# Patient Record
Sex: MALE | Race: BLACK OR AFRICAN AMERICAN | NOT HISPANIC OR LATINO | URBAN - METROPOLITAN AREA
[De-identification: names, ages, dates, MRNs, and addresses within clinical notes are randomized per-mention and may not be internally consistent; named-entity substitution may affect disease eponyms.]

---

## 2019-05-23 ENCOUNTER — INPATIENT (INPATIENT)
Facility: HOSPITAL | Age: 59
LOS: 7 days | Discharge: HOME | End: 2019-05-31
Attending: INTERNAL MEDICINE | Admitting: INTERNAL MEDICINE
Payer: COMMERCIAL

## 2019-05-23 VITALS
OXYGEN SATURATION: 98 % | SYSTOLIC BLOOD PRESSURE: 153 MMHG | DIASTOLIC BLOOD PRESSURE: 87 MMHG | TEMPERATURE: 98 F | RESPIRATION RATE: 18 BRPM | HEART RATE: 84 BPM

## 2019-05-23 LAB
ALBUMIN SERPL ELPH-MCNC: 4 G/DL — SIGNIFICANT CHANGE UP (ref 3.3–5)
ALP SERPL-CCNC: 105 U/L — SIGNIFICANT CHANGE UP (ref 40–120)
ALT FLD-CCNC: 23 U/L — SIGNIFICANT CHANGE UP (ref 4–41)
ANION GAP SERPL CALC-SCNC: 13 MMO/L — SIGNIFICANT CHANGE UP (ref 7–14)
APTT BLD: 30.2 SEC — SIGNIFICANT CHANGE UP (ref 27.5–36.3)
AST SERPL-CCNC: 49 U/L — HIGH (ref 4–40)
BASOPHILS # BLD AUTO: 0.04 K/UL — SIGNIFICANT CHANGE UP (ref 0–0.2)
BASOPHILS NFR BLD AUTO: 0.7 % — SIGNIFICANT CHANGE UP (ref 0–2)
BILIRUB SERPL-MCNC: 0.4 MG/DL — SIGNIFICANT CHANGE UP (ref 0.2–1.2)
BUN SERPL-MCNC: 19 MG/DL — SIGNIFICANT CHANGE UP (ref 7–23)
CALCIUM SERPL-MCNC: 10.2 MG/DL — SIGNIFICANT CHANGE UP (ref 8.4–10.5)
CHLORIDE SERPL-SCNC: 98 MMOL/L — SIGNIFICANT CHANGE UP (ref 98–107)
CO2 SERPL-SCNC: 26 MMOL/L — SIGNIFICANT CHANGE UP (ref 22–31)
CREAT SERPL-MCNC: 1.13 MG/DL — SIGNIFICANT CHANGE UP (ref 0.5–1.3)
EOSINOPHIL # BLD AUTO: 0.22 K/UL — SIGNIFICANT CHANGE UP (ref 0–0.5)
EOSINOPHIL NFR BLD AUTO: 3.7 % — SIGNIFICANT CHANGE UP (ref 0–6)
GLUCOSE SERPL-MCNC: 127 MG/DL — HIGH (ref 70–99)
HCT VFR BLD CALC: 44.4 % — SIGNIFICANT CHANGE UP (ref 39–50)
HGB BLD-MCNC: 14.4 G/DL — SIGNIFICANT CHANGE UP (ref 13–17)
IMM GRANULOCYTES NFR BLD AUTO: 0.2 % — SIGNIFICANT CHANGE UP (ref 0–1.5)
INR BLD: 1.03 — SIGNIFICANT CHANGE UP (ref 0.88–1.17)
LYMPHOCYTES # BLD AUTO: 1.67 K/UL — SIGNIFICANT CHANGE UP (ref 1–3.3)
LYMPHOCYTES # BLD AUTO: 27.9 % — SIGNIFICANT CHANGE UP (ref 13–44)
MCHC RBC-ENTMCNC: 32 PG — SIGNIFICANT CHANGE UP (ref 27–34)
MCHC RBC-ENTMCNC: 32.4 % — SIGNIFICANT CHANGE UP (ref 32–36)
MCV RBC AUTO: 98.7 FL — SIGNIFICANT CHANGE UP (ref 80–100)
MONOCYTES # BLD AUTO: 0.74 K/UL — SIGNIFICANT CHANGE UP (ref 0–0.9)
MONOCYTES NFR BLD AUTO: 12.4 % — SIGNIFICANT CHANGE UP (ref 2–14)
NEUTROPHILS # BLD AUTO: 3.3 K/UL — SIGNIFICANT CHANGE UP (ref 1.8–7.4)
NEUTROPHILS NFR BLD AUTO: 55.1 % — SIGNIFICANT CHANGE UP (ref 43–77)
NRBC # FLD: 0 K/UL — SIGNIFICANT CHANGE UP (ref 0–0)
PLATELET # BLD AUTO: 229 K/UL — SIGNIFICANT CHANGE UP (ref 150–400)
PMV BLD: 9.7 FL — SIGNIFICANT CHANGE UP (ref 7–13)
POTASSIUM SERPL-MCNC: 4.5 MMOL/L — SIGNIFICANT CHANGE UP (ref 3.5–5.3)
POTASSIUM SERPL-SCNC: 4.5 MMOL/L — SIGNIFICANT CHANGE UP (ref 3.5–5.3)
PROT SERPL-MCNC: 8.4 G/DL — HIGH (ref 6–8.3)
PROTHROM AB SERPL-ACNC: 11.7 SEC — SIGNIFICANT CHANGE UP (ref 9.8–13.1)
RBC # BLD: 4.5 M/UL — SIGNIFICANT CHANGE UP (ref 4.2–5.8)
RBC # FLD: 12.2 % — SIGNIFICANT CHANGE UP (ref 10.3–14.5)
SODIUM SERPL-SCNC: 137 MMOL/L — SIGNIFICANT CHANGE UP (ref 135–145)
TROPONIN T, HIGH SENSITIVITY: 15 NG/L — SIGNIFICANT CHANGE UP (ref ?–14)
WBC # BLD: 5.98 K/UL — SIGNIFICANT CHANGE UP (ref 3.8–10.5)
WBC # FLD AUTO: 5.98 K/UL — SIGNIFICANT CHANGE UP (ref 3.8–10.5)

## 2019-05-23 PROCEDURE — 71045 X-RAY EXAM CHEST 1 VIEW: CPT | Mod: 26

## 2019-05-23 RX ORDER — ASPIRIN/CALCIUM CARB/MAGNESIUM 324 MG
324 TABLET ORAL ONCE
Refills: 0 | Status: COMPLETED | OUTPATIENT
Start: 2019-05-23 | End: 2019-05-23

## 2019-05-23 RX ORDER — CEFAZOLIN SODIUM 1 G
1000 VIAL (EA) INJECTION ONCE
Refills: 0 | Status: COMPLETED | OUTPATIENT
Start: 2019-05-23 | End: 2019-05-23

## 2019-05-23 RX ADMIN — Medication 324 MILLIGRAM(S): at 23:56

## 2019-05-23 RX ADMIN — Medication 100 MILLIGRAM(S): at 23:28

## 2019-05-23 NOTE — ED ADULT TRIAGE NOTE - CHIEF COMPLAINT QUOTE
Patient brought to ER by EMS for possible infection to left PICC line. Pt c/o sharp pain to chest times 5 days. Pt was on IV antibiotic. Also possible EKG changes..1st degree heartblock.

## 2019-05-23 NOTE — ED PROVIDER NOTE - ATTENDING CONTRIBUTION TO CARE
Dr. Boone: I have personally performed a face to face bedside history and physical examination of this patient. I have discussed the history, examination, review of systems, assessment and plan of management with the resident. I have reviewed the electronic medical record and amended it to reflect my history, review of systems, physical exam, assessment and plan.    58M with pmh of infected foot ulcers, on IV abx via LLE PICC line p/w multiple complaints. Pt reports that PICC line is mildly pulled out and also that has been having intermittent nonexertional chest pain x few days and his doctor did and EKG and told him there was a "change" and sent him to ED. Pt denies dyspnea, f/c. Reports that foot ulcers have been healing well.    on xam  GEN - NAD; well appearing; A+O x3   HEAD - NC/AT     EYES - EOMI, no conjunctival pallor, no scleral icterus  ENT -   mucous membranes  moist , no discharge      NECK - Neck supple  PULM - CTA b/l,  symmetric breath sounds  COR -  RRR, S1 S2, no murmurs  ABD - , ND, NT, soft, no guarding, no rebound, no masses    BACK - no CVA tenderness, nontender spine     EXTREMS - no edema, no deformity, warm and well perfused. PICC line partially in at left upper arm. no swelling or erythema or fluctuance or crepitus.  SKIN - no rash or bruising. healing wound, dry, without purulence or erythema, with no skin breakage at R great toe and also at plantar aspect of ball of left foot.  NEUROLOGIC - alert, sensation nl, motor 5/5 RUE/LUE/RLE/LLE    chest pain: r/o ACS, ptx, pna, anemia, chf. plan for labs, including trop, EKG, CXR.  PICC line: partially out. no signs of infection. do not suspect DVT. Plan for admission for further assessment by line service. will give a dose of IV abx. Dr. Boone: I have personally performed a face to face bedside history and physical examination of this patient. I have discussed the history, examination, review of systems, assessment and plan of management with the resident. I have reviewed the electronic medical record and amended it to reflect my history, review of systems, physical exam, assessment and plan.    58M with pmh of infected foot ulcers, on IV abx via LLE PICC line p/w multiple complaints. Pt reports that PICC line is mildly pulled out and also that has been having intermittent nonexertional chest pain x few days and his doctor did and EKG and told him there was a "change" and sent him to ED. Pt denies dyspnea, f/c. Reports that foot ulcers have been healing well.    on xam  GEN - NAD; well appearing; A+O x3   HEAD - NC/AT     EYES - EOMI, no conjunctival pallor, no scleral icterus  ENT -   mucous membranes  moist , no discharge      NECK - Neck supple  PULM - CTA b/l,  symmetric breath sounds  COR -  RRR, S1 S2, no murmurs  ABD - , ND, NT, soft, no guarding, no rebound, no masses    BACK - no CVA tenderness, nontender spine     EXTREMS - no edema, no deformity, warm and well perfused. PICC line partially in at left upper arm. no swelling or erythema or fluctuance or crepitus.  SKIN - no rash or bruising. healing wound, dry, without purulence or erythema, with no skin breakage at R great toe and also at plantar aspect of ball of left foot.  NEUROLOGIC - alert, sensation nl, motor 5/5 RUE/LUE/RLE/LLE    chest pain: r/o ACS, ptx, pna, anemia, chf. plan for labs, including trop, EKG, CXR.  PICC line: partially out. no signs of infection. do not suspect DVT. Plan for admission for further assessment by line service. will give a dose of IV abx..

## 2019-05-23 NOTE — ED PROVIDER NOTE - NS ED ROS FT
CONST: no fevers, no chills, no trauma  EYES: no pain, no visual disturbances  ENT: no sore throat, no epistaxis, no rhinorrhea, no hearing changes  CV: + chest pain, no palpitations, no orthopnea, no extremity pain or swelling  RESP: no shortness of breath, no cough, no sputum, no pleurisy, no wheezing  ABD: no abdominal pain, no nausea, no vomiting, no diarrhea, no black or bloody stool  : no dysuria, no hematuria, no frequency, no urgency  MSK: no back pain, no neck pain, no extremity pain  NEURO: no headache, no sensory disturbances, no focal weakness, no dizziness  HEME: no easy bleeding or bruising  SKIN: no diaphoresis, no rash

## 2019-05-23 NOTE — ED ADULT NURSE NOTE - OBJECTIVE STATEMENT
Pt presents to rm 26, A&Ox4, ambulatory w/o assistance, pmhx of HTN, DM, here for evaluation of possible PICC line infection, pt sent by [facility]. Pt states he feels generalized weakness lightheadedness and intermittent chest pain, none at this time. Denies chest pain, shortness of breath, palpitations, diaphoresis, headaches, fevers, dizziness, nausea, vomiting, diarrhea, or urinary symptoms at this time. PICC line observed to left upper arm- pt states whenever they use the line for antibiotic it causes a burning sensation to chest. Pt also states he was told by a nurse that the PICC line is out farther than it should be. Has PICC line for IV antibiotics to treat diabetic ulcers on b/l feet. Call bell in reach, warm blanket provided, bed in lowest position, side rails up x2, MD evaluation in progress.  Will continue to monitor.    Healing Diabetic ulcers observed to bl feet. Dressing dry and intact. No drainage observed to ulcers at this time.

## 2019-05-23 NOTE — ED PROVIDER NOTE - PHYSICAL EXAMINATION
VITALS: reviewed  GEN: NAD, A & O x 4  HEAD/EYES: NCAT, PERRL, EOMI, anicteric sclerae, no conjunctival pallor  ENT: mucus membranes moist, oropharynx WNL, trachea midline,  RESP: lungs CTA with equal breath sounds bilaterally, chest wall nontender and atraumatic  CV: heart with reg rhythm S1, S2, distal pulses intact and symmetric bilaterally  ABDOMEN: normoactive bowel sounds, soft, nondistended, nontender, no palpable masses  : no CVAT  MSK: extremities atraumatic and nontender, no edema, no asymmetry.   SKIN: R great toe ulcer clean & dry, no purulence. L foot ulcer clean/dry. no cyanosis. color appropriate for ethnicity  NEURO: alert, mentating appropriately, no facial asymmetry. gross sensation, motor, coordination are intact  PSYCH: Affect appropriate

## 2019-05-23 NOTE — ED PROVIDER NOTE - CLINICAL SUMMARY MEDICAL DECISION MAKING FREE TEXT BOX
59 yo M presenting for PICC line that fell out, no signs of cellulitis surround PICC. CP, non exertional but with EKG changes from baseline. TBA for PICC line placement and ACS work up

## 2019-05-23 NOTE — ED PROVIDER NOTE - OBJECTIVE STATEMENT
57 yo M hx DM2, HTN, neuropathy, b/l ulcers tx with IV abx via PICC, sent in for PICC line that fell out and for EKG changes from prior. Patient endorses intermittent chest pain for several days, radiating to L arm. No associated nausea/vomiting or diaphoresis. No fevers/chills.

## 2019-05-24 DIAGNOSIS — R07.9 CHEST PAIN, UNSPECIFIED: ICD-10-CM

## 2019-05-24 DIAGNOSIS — I10 ESSENTIAL (PRIMARY) HYPERTENSION: ICD-10-CM

## 2019-05-24 DIAGNOSIS — Z45.2 ENCOUNTER FOR ADJUSTMENT AND MANAGEMENT OF VASCULAR ACCESS DEVICE: Chronic | ICD-10-CM

## 2019-05-24 DIAGNOSIS — K21.9 GASTRO-ESOPHAGEAL REFLUX DISEASE WITHOUT ESOPHAGITIS: ICD-10-CM

## 2019-05-24 DIAGNOSIS — Z29.9 ENCOUNTER FOR PROPHYLACTIC MEASURES, UNSPECIFIED: ICD-10-CM

## 2019-05-24 DIAGNOSIS — E11.621 TYPE 2 DIABETES MELLITUS WITH FOOT ULCER: ICD-10-CM

## 2019-05-24 DIAGNOSIS — E11.9 TYPE 2 DIABETES MELLITUS WITHOUT COMPLICATIONS: ICD-10-CM

## 2019-05-24 DIAGNOSIS — Z90.49 ACQUIRED ABSENCE OF OTHER SPECIFIED PARTS OF DIGESTIVE TRACT: Chronic | ICD-10-CM

## 2019-05-24 DIAGNOSIS — T82.898S OTHER SPECIFIED COMPLICATION OF VASCULAR PROSTHETIC DEVICES, IMPLANTS AND GRAFTS, SEQUELA: ICD-10-CM

## 2019-05-24 LAB
ANION GAP SERPL CALC-SCNC: 11 MMO/L — SIGNIFICANT CHANGE UP (ref 7–14)
BUN SERPL-MCNC: 19 MG/DL — SIGNIFICANT CHANGE UP (ref 7–23)
CALCIUM SERPL-MCNC: 9.8 MG/DL — SIGNIFICANT CHANGE UP (ref 8.4–10.5)
CHLORIDE SERPL-SCNC: 100 MMOL/L — SIGNIFICANT CHANGE UP (ref 98–107)
CHOLEST SERPL-MCNC: 128 MG/DL — SIGNIFICANT CHANGE UP (ref 120–199)
CK MB BLD-MCNC: 2.8 — HIGH (ref 0–2.5)
CK MB BLD-MCNC: 4.63 NG/ML — SIGNIFICANT CHANGE UP (ref 1–6.6)
CK SERPL-CCNC: 167 U/L — SIGNIFICANT CHANGE UP (ref 30–200)
CO2 SERPL-SCNC: 27 MMOL/L — SIGNIFICANT CHANGE UP (ref 22–31)
CREAT SERPL-MCNC: 1.13 MG/DL — SIGNIFICANT CHANGE UP (ref 0.5–1.3)
GLUCOSE SERPL-MCNC: 173 MG/DL — HIGH (ref 70–99)
HBA1C BLD-MCNC: 9 % — HIGH (ref 4–5.6)
HCT VFR BLD CALC: 42.9 % — SIGNIFICANT CHANGE UP (ref 39–50)
HDLC SERPL-MCNC: 37 MG/DL — SIGNIFICANT CHANGE UP (ref 35–55)
HGB BLD-MCNC: 14 G/DL — SIGNIFICANT CHANGE UP (ref 13–17)
LIPID PNL WITH DIRECT LDL SERPL: 79 MG/DL — SIGNIFICANT CHANGE UP
MAGNESIUM SERPL-MCNC: 1.9 MG/DL — SIGNIFICANT CHANGE UP (ref 1.6–2.6)
MCHC RBC-ENTMCNC: 31.6 PG — SIGNIFICANT CHANGE UP (ref 27–34)
MCHC RBC-ENTMCNC: 32.6 % — SIGNIFICANT CHANGE UP (ref 32–36)
MCV RBC AUTO: 96.8 FL — SIGNIFICANT CHANGE UP (ref 80–100)
PHOSPHATE SERPL-MCNC: 3.8 MG/DL — SIGNIFICANT CHANGE UP (ref 2.5–4.5)
PLATELET # BLD AUTO: 224 K/UL — SIGNIFICANT CHANGE UP (ref 150–400)
PMV BLD: 9.2 FL — SIGNIFICANT CHANGE UP (ref 7–13)
POTASSIUM SERPL-MCNC: 4 MMOL/L — SIGNIFICANT CHANGE UP (ref 3.5–5.3)
POTASSIUM SERPL-SCNC: 4 MMOL/L — SIGNIFICANT CHANGE UP (ref 3.5–5.3)
RBC # BLD: 4.43 M/UL — SIGNIFICANT CHANGE UP (ref 4.2–5.8)
RBC # FLD: 12 % — SIGNIFICANT CHANGE UP (ref 10.3–14.5)
SODIUM SERPL-SCNC: 138 MMOL/L — SIGNIFICANT CHANGE UP (ref 135–145)
TRIGL SERPL-MCNC: 119 MG/DL — SIGNIFICANT CHANGE UP (ref 10–149)
TROPONIN T, HIGH SENSITIVITY: 14 NG/L — SIGNIFICANT CHANGE UP (ref ?–14)
TROPONIN T, HIGH SENSITIVITY: 14 NG/L — SIGNIFICANT CHANGE UP (ref ?–14)
TSH SERPL-MCNC: 2.29 UIU/ML — SIGNIFICANT CHANGE UP (ref 0.27–4.2)
WBC # BLD: 5.21 K/UL — SIGNIFICANT CHANGE UP (ref 3.8–10.5)
WBC # FLD AUTO: 5.21 K/UL — SIGNIFICANT CHANGE UP (ref 3.8–10.5)

## 2019-05-24 PROCEDURE — 36584 COMPL RPLCMT PICC RS&I: CPT

## 2019-05-24 RX ORDER — DEXTROSE 50 % IN WATER 50 %
25 SYRINGE (ML) INTRAVENOUS ONCE
Refills: 0 | Status: DISCONTINUED | OUTPATIENT
Start: 2019-05-24 | End: 2019-05-31

## 2019-05-24 RX ORDER — DEXTROSE 50 % IN WATER 50 %
15 SYRINGE (ML) INTRAVENOUS ONCE
Refills: 0 | Status: DISCONTINUED | OUTPATIENT
Start: 2019-05-24 | End: 2019-05-31

## 2019-05-24 RX ORDER — AMLODIPINE BESYLATE 2.5 MG/1
10 TABLET ORAL DAILY
Refills: 0 | Status: DISCONTINUED | OUTPATIENT
Start: 2019-05-24 | End: 2019-05-31

## 2019-05-24 RX ORDER — CEFAZOLIN SODIUM 1 G
1000 VIAL (EA) INJECTION EVERY 8 HOURS
Refills: 0 | Status: DISCONTINUED | OUTPATIENT
Start: 2019-05-24 | End: 2019-05-31

## 2019-05-24 RX ORDER — DEXTROSE 50 % IN WATER 50 %
12.5 SYRINGE (ML) INTRAVENOUS ONCE
Refills: 0 | Status: DISCONTINUED | OUTPATIENT
Start: 2019-05-24 | End: 2019-05-31

## 2019-05-24 RX ORDER — SODIUM CHLORIDE 9 MG/ML
1000 INJECTION, SOLUTION INTRAVENOUS
Refills: 0 | Status: DISCONTINUED | OUTPATIENT
Start: 2019-05-24 | End: 2019-05-31

## 2019-05-24 RX ORDER — SODIUM CHLORIDE 9 MG/ML
3 INJECTION INTRAMUSCULAR; INTRAVENOUS; SUBCUTANEOUS EVERY 8 HOURS
Refills: 0 | Status: DISCONTINUED | OUTPATIENT
Start: 2019-05-24 | End: 2019-05-31

## 2019-05-24 RX ORDER — HEPARIN SODIUM 5000 [USP'U]/ML
5000 INJECTION INTRAVENOUS; SUBCUTANEOUS EVERY 12 HOURS
Refills: 0 | Status: DISCONTINUED | OUTPATIENT
Start: 2019-05-24 | End: 2019-05-31

## 2019-05-24 RX ORDER — PANTOPRAZOLE SODIUM 20 MG/1
40 TABLET, DELAYED RELEASE ORAL
Refills: 0 | Status: DISCONTINUED | OUTPATIENT
Start: 2019-05-24 | End: 2019-05-31

## 2019-05-24 RX ORDER — INSULIN GLARGINE 100 [IU]/ML
50 INJECTION, SOLUTION SUBCUTANEOUS AT BEDTIME
Refills: 0 | Status: DISCONTINUED | OUTPATIENT
Start: 2019-05-24 | End: 2019-05-31

## 2019-05-24 RX ORDER — HYDROCHLOROTHIAZIDE 25 MG
12.5 TABLET ORAL DAILY
Refills: 0 | Status: DISCONTINUED | OUTPATIENT
Start: 2019-05-24 | End: 2019-05-27

## 2019-05-24 RX ORDER — FAMOTIDINE 10 MG/ML
20 INJECTION INTRAVENOUS DAILY
Refills: 0 | Status: DISCONTINUED | OUTPATIENT
Start: 2019-05-24 | End: 2019-05-31

## 2019-05-24 RX ORDER — GABAPENTIN 400 MG/1
600 CAPSULE ORAL THREE TIMES A DAY
Refills: 0 | Status: DISCONTINUED | OUTPATIENT
Start: 2019-05-24 | End: 2019-05-31

## 2019-05-24 RX ORDER — OMEPRAZOLE 10 MG/1
1 CAPSULE, DELAYED RELEASE ORAL
Qty: 0 | Refills: 0 | DISCHARGE

## 2019-05-24 RX ORDER — GLUCAGON INJECTION, SOLUTION 0.5 MG/.1ML
1 INJECTION, SOLUTION SUBCUTANEOUS ONCE
Refills: 0 | Status: DISCONTINUED | OUTPATIENT
Start: 2019-05-24 | End: 2019-05-31

## 2019-05-24 RX ORDER — INSULIN LISPRO 100/ML
VIAL (ML) SUBCUTANEOUS AT BEDTIME
Refills: 0 | Status: DISCONTINUED | OUTPATIENT
Start: 2019-05-24 | End: 2019-05-31

## 2019-05-24 RX ORDER — LOSARTAN POTASSIUM 100 MG/1
100 TABLET, FILM COATED ORAL DAILY
Refills: 0 | Status: DISCONTINUED | OUTPATIENT
Start: 2019-05-24 | End: 2019-05-26

## 2019-05-24 RX ORDER — INSULIN LISPRO 100/ML
VIAL (ML) SUBCUTANEOUS
Refills: 0 | Status: DISCONTINUED | OUTPATIENT
Start: 2019-05-24 | End: 2019-05-31

## 2019-05-24 RX ORDER — AMLODIPINE BESYLATE 2.5 MG/1
1 TABLET ORAL
Qty: 0 | Refills: 0 | DISCHARGE

## 2019-05-24 RX ORDER — GABAPENTIN 400 MG/1
1 CAPSULE ORAL
Qty: 0 | Refills: 0 | DISCHARGE

## 2019-05-24 RX ORDER — RANITIDINE HYDROCHLORIDE 150 MG/1
1 TABLET, FILM COATED ORAL
Qty: 0 | Refills: 0 | DISCHARGE

## 2019-05-24 RX ORDER — ASPIRIN/CALCIUM CARB/MAGNESIUM 324 MG
81 TABLET ORAL DAILY
Refills: 0 | Status: DISCONTINUED | OUTPATIENT
Start: 2019-05-24 | End: 2019-05-31

## 2019-05-24 RX ORDER — ATORVASTATIN CALCIUM 80 MG/1
40 TABLET, FILM COATED ORAL AT BEDTIME
Refills: 0 | Status: DISCONTINUED | OUTPATIENT
Start: 2019-05-24 | End: 2019-05-31

## 2019-05-24 RX ORDER — DAPAGLIFLOZIN 10 MG/1
1 TABLET, FILM COATED ORAL
Qty: 0 | Refills: 0 | DISCHARGE

## 2019-05-24 RX ORDER — INSULIN DETEMIR 100/ML (3)
50 INSULIN PEN (ML) SUBCUTANEOUS
Qty: 0 | Refills: 0 | DISCHARGE

## 2019-05-24 RX ADMIN — AMLODIPINE BESYLATE 10 MILLIGRAM(S): 2.5 TABLET ORAL at 05:15

## 2019-05-24 RX ADMIN — Medication 81 MILLIGRAM(S): at 08:03

## 2019-05-24 RX ADMIN — Medication 100 MILLIGRAM(S): at 08:03

## 2019-05-24 RX ADMIN — SODIUM CHLORIDE 3 MILLILITER(S): 9 INJECTION INTRAMUSCULAR; INTRAVENOUS; SUBCUTANEOUS at 13:05

## 2019-05-24 RX ADMIN — LOSARTAN POTASSIUM 100 MILLIGRAM(S): 100 TABLET, FILM COATED ORAL at 05:15

## 2019-05-24 RX ADMIN — Medication 2: at 09:04

## 2019-05-24 RX ADMIN — SODIUM CHLORIDE 3 MILLILITER(S): 9 INJECTION INTRAMUSCULAR; INTRAVENOUS; SUBCUTANEOUS at 21:40

## 2019-05-24 RX ADMIN — GABAPENTIN 600 MILLIGRAM(S): 400 CAPSULE ORAL at 22:08

## 2019-05-24 RX ADMIN — SODIUM CHLORIDE 3 MILLILITER(S): 9 INJECTION INTRAMUSCULAR; INTRAVENOUS; SUBCUTANEOUS at 05:17

## 2019-05-24 RX ADMIN — Medication 100 MILLIGRAM(S): at 13:08

## 2019-05-24 RX ADMIN — INSULIN GLARGINE 50 UNIT(S): 100 INJECTION, SOLUTION SUBCUTANEOUS at 21:40

## 2019-05-24 RX ADMIN — HEPARIN SODIUM 5000 UNIT(S): 5000 INJECTION INTRAVENOUS; SUBCUTANEOUS at 05:16

## 2019-05-24 RX ADMIN — Medication 2: at 17:15

## 2019-05-24 RX ADMIN — ATORVASTATIN CALCIUM 40 MILLIGRAM(S): 80 TABLET, FILM COATED ORAL at 21:34

## 2019-05-24 RX ADMIN — Medication 100 MILLIGRAM(S): at 21:34

## 2019-05-24 RX ADMIN — FAMOTIDINE 20 MILLIGRAM(S): 10 INJECTION INTRAVENOUS at 08:03

## 2019-05-24 RX ADMIN — GABAPENTIN 600 MILLIGRAM(S): 400 CAPSULE ORAL at 05:16

## 2019-05-24 RX ADMIN — PANTOPRAZOLE SODIUM 40 MILLIGRAM(S): 20 TABLET, DELAYED RELEASE ORAL at 08:03

## 2019-05-24 RX ADMIN — HEPARIN SODIUM 5000 UNIT(S): 5000 INJECTION INTRAVENOUS; SUBCUTANEOUS at 17:15

## 2019-05-24 RX ADMIN — GABAPENTIN 600 MILLIGRAM(S): 400 CAPSULE ORAL at 14:54

## 2019-05-24 RX ADMIN — Medication 12.5 MILLIGRAM(S): at 05:15

## 2019-05-24 NOTE — H&P ADULT - ATTENDING COMMENTS
58 year old man with HTN, uncontrolled IDDM, 1st degree AV block and PVD presents with angina.    #Angina-  Chest pain with exertion.  Patient has ruled out for ACS and EKG is sinus with 1st degree AV block.  Given risk factors of uncontrolled IDDM and peripheral vascular disease, will plan for further evaluation with pharmacologic nuclear stress testing.    #Diabetic foot ulcers-  Please call IR for PICC line replacement.  Ancef through June 6th 2019.  Wound care consult.     #HTN-   BP acceptable on current regimen.

## 2019-05-24 NOTE — H&P ADULT - NSICDXPASTSURGICALHX_GEN_ALL_CORE_FT
PAST SURGICAL HISTORY:  PICC (peripherally inserted central catheter) in place     S/P cholecystectomy

## 2019-05-24 NOTE — ADVANCED PRACTICE NURSE CONSULT - RECOMMEDATIONS
Continue to follow up with outpatient podiatrist as instructed.   Diabetes education, HgbA1C 9.0%    Topical Recommendations:  Bilateral plantar feet- apply liquid barrier film daily, keep open to air. Education patient regarding foot inspection, reviewed use of hand held mirror for complete inspection of feet daily. Reviewed proper foot wear. Reviewed importance of proper foot care in setting of diabetes.    Continue heel elevation while in bed, continue to encourage frequent weight shifts while in bed or chair, continue moisture management with omar moisture barrier cream & single breathable pad.   Continue measures to decrease friction/shear/pressure.   Continue with nutritional support as per dietary/orders.    Findings and plan discussed with patient. All questions and concerns addressed.    Please contact Wound Care Service Line if we can be of further assistance (ext [phone]).

## 2019-05-24 NOTE — H&P ADULT - NEUROLOGICAL DETAILS
normal strength/no spontaneous movement/alert and oriented x 3/responds to verbal commands/sensation intact

## 2019-05-24 NOTE — ADVANCED PRACTICE NURSE CONSULT - REASON FOR CONSULT
Patient seen on skin care rounds after wound care referral received for assessment of skin impairment and recommendations of topical management. Chart reviewed: Patient H/O obese ambulates with a cane, history of DM2, HTN, Neuropathy, old RLE Great toe ulcer x 3 years and L LE Plantar foot ulcer since 4/22/19, out pt treatment with abx through a L UE PICC line. The pt says visiting nurse service came on 4/23 and adjusted the L UE PICC Line and it began to come out. VN attempted to push the PICC line back in. But was unsuccessful. EMS called and the pt sent to the Ed. In the Ed, the expresses when he injects his antibiotics, he experiences L UE, L chest and throat burning, with SOB and dizziness, which subsides on it's own. Denies nausea, vomit, falls, LOC, chills, diaphoresis. In the Ed,EKG SR with 1st * AVB, QW V2,, HsT= 15->14, received Ancef 1g IV and currently has no complaints. Pt admitted for possible LUE PICC line dislodgment and left chest pain. Mark score 19, BMI 42.6 kg/m2, HgbA1C 9.0%. Patient interviewed, has had wound of Left foot since April after he stepped on a stable, right foot ulcer present for 3 years. Follows with Podiatrist at [facility], as per patient "my doctor said my left foot looks good and is healed, my right foot is just some blood under the skin. I can't see to confirm or deny."

## 2019-05-24 NOTE — H&P ADULT - NSHPLABSRESULTS_GEN_ALL_CORE
HsT= 15-> 14  Glucose= 127  CXR Preliminary clear  EKG SR @ 78b/ min 1st* AVB, QW V2, Qt/QTC= 370/ 421  Started Ancef 1 gram IV Q8*

## 2019-05-24 NOTE — CHART NOTE - NSCHARTNOTEFT_GEN_A_CORE
PRE-INTERVENTIONAL RADIOLOGY PROCEDURE NOTE      Patient Age: 58     Patient Gender: Male     Procedure: PICC Line placement     Diagnosis/Indication: Long term antibiotics     Interventional Radiology Attending Physician:     Ordering Attending Physician: Dr. Ball    Pertinent Medical History: DM HTN    Pertinent labs:                      14.0   5.21  )-----------( 224      ( 24 May 2019 05:10 )             42.9       05-24    138  |  100  |  19  ----------------------------<  173<H>  4.0   |  27  |  1.13    Ca    9.8      24 May 2019 05:10  Phos  3.8     05-24  Mg     1.9     05-24    TPro  8.4<H>  /  Alb  4.0  /  TBili  0.4  /  DBili  x   /  AST  49<H>  /  ALT  23  /  AlkPhos  105  05-23      PT/INR - ( 23 May 2019 20:00 )   PT: 11.7 SEC;   INR: 1.03          PTT - ( 23 May 2019 20:00 )  PTT:30.2 SEC        Patient and Family Aware ? Yes

## 2019-05-24 NOTE — PHYSICAL THERAPY INITIAL EVALUATION ADULT - GAIT DISTANCE, PT EVAL
100 feet/pt with steady gait with cane, c/o feeling generalized weakness and 'achy' in hips bilaterally

## 2019-05-24 NOTE — ADVANCED PRACTICE NURSE CONSULT - ASSESSMENT
General: A&Ox 4, ambulates with cane, continent of urine and stool. Skin warm, dry with increased moisture in intertriginous folds, adequate skin turgor, scattered areas of hyperpigmentation and hypopigmentation.    Vascular: Bilateral lower extremities with scattered areas of hyper and hypopigmentation. Dry, flaky skin, more severe on bilateral plantar feet and in interdigital spaces. Thickened-hyperpigmented fungal toenails. No temperature changes, no edema noted. Capillary refill <3 seconds. +2 DP/PT pulses, with biphasic doppler sounds. reports numbness of bilateral feet at times.    Left plantar foot between 1st and 2cnd toe- linear fissure (secondary to stepping on nail apx 1 month ago) with callused edges, unable to visualize base of fissure due to narrow opening. No drainage noted, periwound skin intact with dry, flaky skin.     Right plantar great toe- reabsorbed blood filled blister 1.3cmx0.3cmx0, irregular borders. No palpable skin changes , periwound skin intact. Goals of care: monitor for changes in tissue type.

## 2019-05-24 NOTE — H&P ADULT - RS GEN PE MLT RESP DETAILS PC
breath sounds equal/good air movement/respirations non-labored/no wheezes/airway patent/no intercostal retractions/no rales/no chest wall tenderness/clear to auscultation bilaterally/no rhonchi/no subcutaneous emphysema

## 2019-05-24 NOTE — H&P ADULT - EXTREMITIES COMMENTS
R LE with old ulcer to plantar surface off Great toe.  L LE with plantar ulcer and dressing C/D/I.  L UE PICC LINE.

## 2019-05-24 NOTE — H&P ADULT - NEGATIVE ENMT SYMPTOMS
no tinnitus/no nasal discharge/no nasal obstruction/no post-nasal discharge/no sinus symptoms/no nasal congestion

## 2019-05-24 NOTE — H&P ADULT - NSICDXPASTMEDICALHX_GEN_ALL_CORE_FT
PAST MEDICAL HISTORY:  Chronic GERD     Diabetes mellitus, type 2     Diabetic foot ulcers     Hypertension

## 2019-05-24 NOTE — H&P ADULT - HISTORY OF PRESENT ILLNESS
58M, obese ambulates with a cane, history of DM2, HTN, Neuropathy, old RLE Great toe ulcer x 3 years and L LE Plantar foot ulcer since 4/22/19, out pt treatment with abx through a L UE PICC line. The pt says visiting nurse service came on 4/23 and adjusted the L UE PICC Line and it began to come out. VN attempted to push the PICC line back in. But was unsuccessful. EMS called and the pt sent to the Ed. In the Ed, the expresses when he injects his antibiotics, he experiences L UE, L chest and throat burning, with SOB and dizziness, which subsides on it's own. Denies nausea, vomit, falls, LOC, chills, diaphoresis. In the Ed,EKG SR with 1st * AVB, QW V2,, HsT= 15->14, received Ancef 1g IV and currently has no complaints.

## 2019-05-24 NOTE — H&P ADULT - NEGATIVE NEUROLOGICAL SYMPTOMS
no tremors/no vertigo/no hemiparesis/no transient paralysis/no weakness/no paresthesias/no generalized seizures/no headache/no facial palsy/no confusion/no focal seizures/no syncope/no loss of sensation/no difficulty walking/no loss of consciousness

## 2019-05-25 LAB
ANION GAP SERPL CALC-SCNC: 14 MMO/L — SIGNIFICANT CHANGE UP (ref 7–14)
BUN SERPL-MCNC: 27 MG/DL — HIGH (ref 7–23)
CALCIUM SERPL-MCNC: 9.5 MG/DL — SIGNIFICANT CHANGE UP (ref 8.4–10.5)
CHLORIDE SERPL-SCNC: 99 MMOL/L — SIGNIFICANT CHANGE UP (ref 98–107)
CO2 SERPL-SCNC: 24 MMOL/L — SIGNIFICANT CHANGE UP (ref 22–31)
CREAT SERPL-MCNC: 1.22 MG/DL — SIGNIFICANT CHANGE UP (ref 0.5–1.3)
GLUCOSE SERPL-MCNC: 157 MG/DL — HIGH (ref 70–99)
HCT VFR BLD CALC: 42.2 % — SIGNIFICANT CHANGE UP (ref 39–50)
HCV AB S/CO SERPL IA: 15.53 S/CO — HIGH (ref 0–0.99)
HCV AB SERPL-IMP: REACTIVE — SIGNIFICANT CHANGE UP
HGB BLD-MCNC: 13.9 G/DL — SIGNIFICANT CHANGE UP (ref 13–17)
MAGNESIUM SERPL-MCNC: 1.9 MG/DL — SIGNIFICANT CHANGE UP (ref 1.6–2.6)
MCHC RBC-ENTMCNC: 31.7 PG — SIGNIFICANT CHANGE UP (ref 27–34)
MCHC RBC-ENTMCNC: 32.9 % — SIGNIFICANT CHANGE UP (ref 32–36)
MCV RBC AUTO: 96.1 FL — SIGNIFICANT CHANGE UP (ref 80–100)
NRBC # FLD: 0 K/UL — SIGNIFICANT CHANGE UP (ref 0–0)
PLATELET # BLD AUTO: 214 K/UL — SIGNIFICANT CHANGE UP (ref 150–400)
PMV BLD: 9.5 FL — SIGNIFICANT CHANGE UP (ref 7–13)
POTASSIUM SERPL-MCNC: 4 MMOL/L — SIGNIFICANT CHANGE UP (ref 3.5–5.3)
POTASSIUM SERPL-SCNC: 4 MMOL/L — SIGNIFICANT CHANGE UP (ref 3.5–5.3)
RBC # BLD: 4.39 M/UL — SIGNIFICANT CHANGE UP (ref 4.2–5.8)
RBC # FLD: 11.9 % — SIGNIFICANT CHANGE UP (ref 10.3–14.5)
SODIUM SERPL-SCNC: 137 MMOL/L — SIGNIFICANT CHANGE UP (ref 135–145)
WBC # BLD: 4.45 K/UL — SIGNIFICANT CHANGE UP (ref 3.8–10.5)
WBC # FLD AUTO: 4.45 K/UL — SIGNIFICANT CHANGE UP (ref 3.8–10.5)

## 2019-05-25 RX ADMIN — Medication 100 MILLIGRAM(S): at 13:30

## 2019-05-25 RX ADMIN — GABAPENTIN 600 MILLIGRAM(S): 400 CAPSULE ORAL at 22:12

## 2019-05-25 RX ADMIN — ATORVASTATIN CALCIUM 40 MILLIGRAM(S): 80 TABLET, FILM COATED ORAL at 22:12

## 2019-05-25 RX ADMIN — Medication 100 MILLIGRAM(S): at 22:12

## 2019-05-25 RX ADMIN — GABAPENTIN 600 MILLIGRAM(S): 400 CAPSULE ORAL at 13:31

## 2019-05-25 RX ADMIN — Medication 100 MILLIGRAM(S): at 05:27

## 2019-05-25 RX ADMIN — SODIUM CHLORIDE 3 MILLILITER(S): 9 INJECTION INTRAMUSCULAR; INTRAVENOUS; SUBCUTANEOUS at 22:09

## 2019-05-25 RX ADMIN — Medication 81 MILLIGRAM(S): at 09:09

## 2019-05-25 RX ADMIN — Medication 12.5 MILLIGRAM(S): at 05:27

## 2019-05-25 RX ADMIN — LOSARTAN POTASSIUM 100 MILLIGRAM(S): 100 TABLET, FILM COATED ORAL at 05:27

## 2019-05-25 RX ADMIN — FAMOTIDINE 20 MILLIGRAM(S): 10 INJECTION INTRAVENOUS at 09:10

## 2019-05-25 RX ADMIN — AMLODIPINE BESYLATE 10 MILLIGRAM(S): 2.5 TABLET ORAL at 05:27

## 2019-05-25 RX ADMIN — PANTOPRAZOLE SODIUM 40 MILLIGRAM(S): 20 TABLET, DELAYED RELEASE ORAL at 05:27

## 2019-05-25 RX ADMIN — HEPARIN SODIUM 5000 UNIT(S): 5000 INJECTION INTRAVENOUS; SUBCUTANEOUS at 17:06

## 2019-05-25 RX ADMIN — HEPARIN SODIUM 5000 UNIT(S): 5000 INJECTION INTRAVENOUS; SUBCUTANEOUS at 05:27

## 2019-05-25 RX ADMIN — SODIUM CHLORIDE 3 MILLILITER(S): 9 INJECTION INTRAMUSCULAR; INTRAVENOUS; SUBCUTANEOUS at 13:26

## 2019-05-25 RX ADMIN — SODIUM CHLORIDE 3 MILLILITER(S): 9 INJECTION INTRAMUSCULAR; INTRAVENOUS; SUBCUTANEOUS at 05:27

## 2019-05-25 RX ADMIN — GABAPENTIN 600 MILLIGRAM(S): 400 CAPSULE ORAL at 05:27

## 2019-05-25 RX ADMIN — INSULIN GLARGINE 50 UNIT(S): 100 INJECTION, SOLUTION SUBCUTANEOUS at 22:20

## 2019-05-26 LAB
ANION GAP SERPL CALC-SCNC: 14 MMO/L — SIGNIFICANT CHANGE UP (ref 7–14)
BUN SERPL-MCNC: 37 MG/DL — HIGH (ref 7–23)
CALCIUM SERPL-MCNC: 9.4 MG/DL — SIGNIFICANT CHANGE UP (ref 8.4–10.5)
CHLORIDE SERPL-SCNC: 98 MMOL/L — SIGNIFICANT CHANGE UP (ref 98–107)
CO2 SERPL-SCNC: 24 MMOL/L — SIGNIFICANT CHANGE UP (ref 22–31)
CREAT SERPL-MCNC: 1.59 MG/DL — HIGH (ref 0.5–1.3)
GLUCOSE SERPL-MCNC: 133 MG/DL — HIGH (ref 70–99)
HCT VFR BLD CALC: 43.1 % — SIGNIFICANT CHANGE UP (ref 39–50)
HGB BLD-MCNC: 14 G/DL — SIGNIFICANT CHANGE UP (ref 13–17)
MAGNESIUM SERPL-MCNC: 2 MG/DL — SIGNIFICANT CHANGE UP (ref 1.6–2.6)
MCHC RBC-ENTMCNC: 31.7 PG — SIGNIFICANT CHANGE UP (ref 27–34)
MCHC RBC-ENTMCNC: 32.5 % — SIGNIFICANT CHANGE UP (ref 32–36)
MCV RBC AUTO: 97.7 FL — SIGNIFICANT CHANGE UP (ref 80–100)
NRBC # FLD: 0 K/UL — SIGNIFICANT CHANGE UP (ref 0–0)
PLATELET # BLD AUTO: 218 K/UL — SIGNIFICANT CHANGE UP (ref 150–400)
PMV BLD: 9.8 FL — SIGNIFICANT CHANGE UP (ref 7–13)
POTASSIUM SERPL-MCNC: 3.9 MMOL/L — SIGNIFICANT CHANGE UP (ref 3.5–5.3)
POTASSIUM SERPL-SCNC: 3.9 MMOL/L — SIGNIFICANT CHANGE UP (ref 3.5–5.3)
RBC # BLD: 4.41 M/UL — SIGNIFICANT CHANGE UP (ref 4.2–5.8)
RBC # FLD: 12 % — SIGNIFICANT CHANGE UP (ref 10.3–14.5)
SODIUM SERPL-SCNC: 136 MMOL/L — SIGNIFICANT CHANGE UP (ref 135–145)
WBC # BLD: 6.67 K/UL — SIGNIFICANT CHANGE UP (ref 3.8–10.5)
WBC # FLD AUTO: 6.67 K/UL — SIGNIFICANT CHANGE UP (ref 3.8–10.5)

## 2019-05-26 PROCEDURE — 93018 CV STRESS TEST I&R ONLY: CPT | Mod: GC

## 2019-05-26 PROCEDURE — 93016 CV STRESS TEST SUPVJ ONLY: CPT | Mod: GC

## 2019-05-26 PROCEDURE — 78452 HT MUSCLE IMAGE SPECT MULT: CPT | Mod: 26

## 2019-05-26 RX ADMIN — SODIUM CHLORIDE 3 MILLILITER(S): 9 INJECTION INTRAMUSCULAR; INTRAVENOUS; SUBCUTANEOUS at 21:47

## 2019-05-26 RX ADMIN — HEPARIN SODIUM 5000 UNIT(S): 5000 INJECTION INTRAVENOUS; SUBCUTANEOUS at 05:39

## 2019-05-26 RX ADMIN — GABAPENTIN 600 MILLIGRAM(S): 400 CAPSULE ORAL at 05:39

## 2019-05-26 RX ADMIN — GABAPENTIN 600 MILLIGRAM(S): 400 CAPSULE ORAL at 13:19

## 2019-05-26 RX ADMIN — Medication 12.5 MILLIGRAM(S): at 05:39

## 2019-05-26 RX ADMIN — Medication 100 MILLIGRAM(S): at 21:44

## 2019-05-26 RX ADMIN — INSULIN GLARGINE 50 UNIT(S): 100 INJECTION, SOLUTION SUBCUTANEOUS at 21:44

## 2019-05-26 RX ADMIN — FAMOTIDINE 20 MILLIGRAM(S): 10 INJECTION INTRAVENOUS at 13:19

## 2019-05-26 RX ADMIN — Medication 100 MILLIGRAM(S): at 13:19

## 2019-05-26 RX ADMIN — SODIUM CHLORIDE 3 MILLILITER(S): 9 INJECTION INTRAMUSCULAR; INTRAVENOUS; SUBCUTANEOUS at 05:38

## 2019-05-26 RX ADMIN — SODIUM CHLORIDE 3 MILLILITER(S): 9 INJECTION INTRAMUSCULAR; INTRAVENOUS; SUBCUTANEOUS at 13:18

## 2019-05-26 RX ADMIN — Medication 100 MILLIGRAM(S): at 05:39

## 2019-05-26 RX ADMIN — HEPARIN SODIUM 5000 UNIT(S): 5000 INJECTION INTRAVENOUS; SUBCUTANEOUS at 17:32

## 2019-05-26 RX ADMIN — PANTOPRAZOLE SODIUM 40 MILLIGRAM(S): 20 TABLET, DELAYED RELEASE ORAL at 05:39

## 2019-05-26 RX ADMIN — LOSARTAN POTASSIUM 100 MILLIGRAM(S): 100 TABLET, FILM COATED ORAL at 05:39

## 2019-05-26 RX ADMIN — GABAPENTIN 600 MILLIGRAM(S): 400 CAPSULE ORAL at 21:44

## 2019-05-26 RX ADMIN — AMLODIPINE BESYLATE 10 MILLIGRAM(S): 2.5 TABLET ORAL at 05:39

## 2019-05-26 RX ADMIN — Medication 2: at 13:19

## 2019-05-26 RX ADMIN — ATORVASTATIN CALCIUM 40 MILLIGRAM(S): 80 TABLET, FILM COATED ORAL at 21:44

## 2019-05-26 RX ADMIN — Medication 81 MILLIGRAM(S): at 13:19

## 2019-05-26 RX ADMIN — Medication 2: at 17:31

## 2019-05-27 LAB
ANION GAP SERPL CALC-SCNC: 11 MMO/L — SIGNIFICANT CHANGE UP (ref 7–14)
APPEARANCE UR: CLEAR — SIGNIFICANT CHANGE UP
BACTERIA # UR AUTO: NEGATIVE — SIGNIFICANT CHANGE UP
BILIRUB UR-MCNC: NEGATIVE — SIGNIFICANT CHANGE UP
BLOOD UR QL VISUAL: NEGATIVE — SIGNIFICANT CHANGE UP
BUN SERPL-MCNC: 44 MG/DL — HIGH (ref 7–23)
CALCIUM SERPL-MCNC: 8.7 MG/DL — SIGNIFICANT CHANGE UP (ref 8.4–10.5)
CHLORIDE SERPL-SCNC: 97 MMOL/L — LOW (ref 98–107)
CO2 SERPL-SCNC: 24 MMOL/L — SIGNIFICANT CHANGE UP (ref 22–31)
COLOR SPEC: SIGNIFICANT CHANGE UP
CREAT ?TM UR-MCNC: 107.4 MG/DL — SIGNIFICANT CHANGE UP
CREAT SERPL-MCNC: 1.85 MG/DL — HIGH (ref 0.5–1.3)
GLUCOSE SERPL-MCNC: 194 MG/DL — HIGH (ref 70–99)
GLUCOSE UR-MCNC: NEGATIVE — SIGNIFICANT CHANGE UP
HCT VFR BLD CALC: 42.2 % — SIGNIFICANT CHANGE UP (ref 39–50)
HGB BLD-MCNC: 13.6 G/DL — SIGNIFICANT CHANGE UP (ref 13–17)
HYALINE CASTS # UR AUTO: NEGATIVE — SIGNIFICANT CHANGE UP
KETONES UR-MCNC: NEGATIVE — SIGNIFICANT CHANGE UP
LEUKOCYTE ESTERASE UR-ACNC: NEGATIVE — SIGNIFICANT CHANGE UP
MAGNESIUM SERPL-MCNC: 2.3 MG/DL — SIGNIFICANT CHANGE UP (ref 1.6–2.6)
MCHC RBC-ENTMCNC: 31.9 PG — SIGNIFICANT CHANGE UP (ref 27–34)
MCHC RBC-ENTMCNC: 32.2 % — SIGNIFICANT CHANGE UP (ref 32–36)
MCV RBC AUTO: 98.8 FL — SIGNIFICANT CHANGE UP (ref 80–100)
NITRITE UR-MCNC: NEGATIVE — SIGNIFICANT CHANGE UP
NRBC # FLD: 0 K/UL — SIGNIFICANT CHANGE UP (ref 0–0)
PH UR: 6 — SIGNIFICANT CHANGE UP (ref 5–8)
PLATELET # BLD AUTO: 216 K/UL — SIGNIFICANT CHANGE UP (ref 150–400)
PMV BLD: 9.9 FL — SIGNIFICANT CHANGE UP (ref 7–13)
POTASSIUM SERPL-MCNC: 4.1 MMOL/L — SIGNIFICANT CHANGE UP (ref 3.5–5.3)
POTASSIUM SERPL-SCNC: 4.1 MMOL/L — SIGNIFICANT CHANGE UP (ref 3.5–5.3)
PROT UR-MCNC: 100 — HIGH
RBC # BLD: 4.27 M/UL — SIGNIFICANT CHANGE UP (ref 4.2–5.8)
RBC # FLD: 12.2 % — SIGNIFICANT CHANGE UP (ref 10.3–14.5)
RBC CASTS # UR COMP ASSIST: SIGNIFICANT CHANGE UP (ref 0–?)
SODIUM SERPL-SCNC: 132 MMOL/L — LOW (ref 135–145)
SP GR SPEC: 1.02 — SIGNIFICANT CHANGE UP (ref 1–1.04)
SQUAMOUS # UR AUTO: SIGNIFICANT CHANGE UP
UROBILINOGEN FLD QL: NORMAL — SIGNIFICANT CHANGE UP
UUN UR-MCNC: 733.1 MG/DL — SIGNIFICANT CHANGE UP
WBC # BLD: 6.77 K/UL — SIGNIFICANT CHANGE UP (ref 3.8–10.5)
WBC # FLD AUTO: 6.77 K/UL — SIGNIFICANT CHANGE UP (ref 3.8–10.5)
WBC UR QL: SIGNIFICANT CHANGE UP (ref 0–?)

## 2019-05-27 RX ORDER — SODIUM CHLORIDE 9 MG/ML
1000 INJECTION INTRAMUSCULAR; INTRAVENOUS; SUBCUTANEOUS
Refills: 0 | Status: COMPLETED | OUTPATIENT
Start: 2019-05-27 | End: 2019-05-27

## 2019-05-27 RX ADMIN — SODIUM CHLORIDE 75 MILLILITER(S): 9 INJECTION INTRAMUSCULAR; INTRAVENOUS; SUBCUTANEOUS at 16:44

## 2019-05-27 RX ADMIN — Medication 12.5 MILLIGRAM(S): at 08:53

## 2019-05-27 RX ADMIN — SODIUM CHLORIDE 3 MILLILITER(S): 9 INJECTION INTRAMUSCULAR; INTRAVENOUS; SUBCUTANEOUS at 06:22

## 2019-05-27 RX ADMIN — PANTOPRAZOLE SODIUM 40 MILLIGRAM(S): 20 TABLET, DELAYED RELEASE ORAL at 05:22

## 2019-05-27 RX ADMIN — FAMOTIDINE 20 MILLIGRAM(S): 10 INJECTION INTRAVENOUS at 08:53

## 2019-05-27 RX ADMIN — SODIUM CHLORIDE 3 MILLILITER(S): 9 INJECTION INTRAMUSCULAR; INTRAVENOUS; SUBCUTANEOUS at 21:21

## 2019-05-27 RX ADMIN — Medication 100 MILLIGRAM(S): at 05:23

## 2019-05-27 RX ADMIN — GABAPENTIN 600 MILLIGRAM(S): 400 CAPSULE ORAL at 13:00

## 2019-05-27 RX ADMIN — HEPARIN SODIUM 5000 UNIT(S): 5000 INJECTION INTRAVENOUS; SUBCUTANEOUS at 17:21

## 2019-05-27 RX ADMIN — Medication 100 MILLIGRAM(S): at 21:31

## 2019-05-27 RX ADMIN — Medication 2: at 12:57

## 2019-05-27 RX ADMIN — Medication 100 MILLIGRAM(S): at 13:00

## 2019-05-27 RX ADMIN — GABAPENTIN 600 MILLIGRAM(S): 400 CAPSULE ORAL at 21:31

## 2019-05-27 RX ADMIN — HEPARIN SODIUM 5000 UNIT(S): 5000 INJECTION INTRAVENOUS; SUBCUTANEOUS at 05:23

## 2019-05-27 RX ADMIN — ATORVASTATIN CALCIUM 40 MILLIGRAM(S): 80 TABLET, FILM COATED ORAL at 21:31

## 2019-05-27 RX ADMIN — INSULIN GLARGINE 50 UNIT(S): 100 INJECTION, SOLUTION SUBCUTANEOUS at 22:14

## 2019-05-27 RX ADMIN — SODIUM CHLORIDE 3 MILLILITER(S): 9 INJECTION INTRAMUSCULAR; INTRAVENOUS; SUBCUTANEOUS at 13:02

## 2019-05-27 RX ADMIN — AMLODIPINE BESYLATE 10 MILLIGRAM(S): 2.5 TABLET ORAL at 08:53

## 2019-05-27 RX ADMIN — Medication 2: at 17:20

## 2019-05-27 RX ADMIN — Medication 81 MILLIGRAM(S): at 08:52

## 2019-05-27 RX ADMIN — GABAPENTIN 600 MILLIGRAM(S): 400 CAPSULE ORAL at 05:23

## 2019-05-27 NOTE — CONSULT NOTE ADULT - ASSESSMENT
58M, obese ambulates with a cane, history of DM2, HTN, Neuropathy, old RLE Great toe ulcer x 3 years and L LE Plantar foot ulcer since 4/22/19 admitted to rule out ACS developed renal failure    A/P:  VICKY:  Possible pre-renal state (sec to uncontrolled DM with HCTZ), while on ARB  s/p off ARB last dose 05/26  Discontinue HCTZ (Received dose of 05/27)  NS 75cc/hr for 1L  Better control of diabetes  Also had marginal vitals on 05/25, rule out ATN  Get UA, Urine urea nitrogen, Cr, renal US  On same antibiotics before he came to the hospital, makes AIN unlikely  get CBC with diff in AM  Monitor renal function closely  Avoid Nephrotoxic meds    Hyponatremia:  Sec to uncontrolled DM + HCTZ  D/C HCTZ  Monitor Na level at present    HTN:  Controlled  Add Hydralazine if BP is high

## 2019-05-27 NOTE — CONSULT NOTE ADULT - SUBJECTIVE AND OBJECTIVE BOX
Dr. Huffman  Office [phone]  Cell [phone]  Shae FAUST  Cell [phone]    HPI:  58M, obese ambulates with a cane, history of DM2, HTN, Neuropathy, old RLE Great toe ulcer x 3 years and L LE Plantar foot ulcer since 4/22/19, out pt treatment with abx through a L UE PICC line. The pt says visiting nurse service came on 4/23 and adjusted the L UE PICC Line and it began to come out. VN attempted to push the PICC line back in. But was unsuccessful. EMS called and the pt sent to the Ed. In the Ed, the expresses when he injects his antibiotics, he experiences L UE, L chest and throat burning, with SOB and dizziness, which subsides on it's own. He got admitted for cardiac evaluation, has developed renal failure      Allergies:  No Known Allergies      PAST MEDICAL & SURGICAL HISTORY:  Diabetic foot ulcers  Chronic GERD  Diabetes mellitus, type 2  Hypertension  S/P cholecystectomy  PICC (peripherally inserted central catheter) in place      Home Medications Reviewed    Hospital Medications:   MEDICATIONS  (STANDING):  amLODIPine   Tablet 10 milliGRAM(s) Oral daily  aspirin enteric coated 81 milliGRAM(s) Oral daily  atorvastatin 40 milliGRAM(s) Oral at bedtime  ceFAZolin   IVPB 1000 milliGRAM(s) IV Intermittent every 8 hours  dextrose 5%. 1000 milliLiter(s) (50 mL/Hr) IV Continuous <Continuous>  dextrose 50% Injectable 12.5 Gram(s) IV Push once  dextrose 50% Injectable 25 Gram(s) IV Push once  dextrose 50% Injectable 25 Gram(s) IV Push once  famotidine    Tablet 20 milliGRAM(s) Oral daily  gabapentin 600 milliGRAM(s) Oral three times a day  heparin  Injectable 5000 Unit(s) SubCutaneous every 12 hours  hydrochlorothiazide 12.5 milliGRAM(s) Oral daily  insulin glargine Injectable (LANTUS) 50 Unit(s) SubCutaneous at bedtime  insulin lispro (HumaLOG) corrective regimen sliding scale   SubCutaneous three times a day before meals  insulin lispro (HumaLOG) corrective regimen sliding scale   SubCutaneous at bedtime  pantoprazole    Tablet 40 milliGRAM(s) Oral before breakfast  sodium chloride 0.9% lock flush 3 milliLiter(s) IV Push every 8 hours      SOCIAL HISTORY:  Denies ETOh, Smoking,     FAMILY HISTORY:  FH: type 2 diabetes  FH: hypertension      REVIEW OF SYSTEMS:  CONSTITUTIONAL: No weakness, fevers or chills  EYES/ENT: No visual changes;  No vertigo or throat pain   NECK: No pain or stiffness  RESPIRATORY: No cough, wheezing, hemoptysis; No shortness of breath  CARDIOVASCULAR: No chest pain or palpitations.  GASTROINTESTINAL: No abdominal or epigastric pain. No nausea, vomiting, or hematemesis; No diarrhea or constipation. No melena or hematochezia.  GENITOURINARY: No dysuria, frequency, foamy urine, urinary urgency, incontinence or hematuria  NEUROLOGICAL: No numbness or weakness  SKIN: No itching, burning, rashes, or lesions   VASCULAR: No bilateral lower extremity edema.   All other review of systems is negative unless indicated above.    VITALS:  T(F): 98.6 (05-27-19 @ 14:26), Max: 98.6 (05-27-19 @ 14:26)  HR: 75 (05-27-19 @ 14:26)  BP: 118/74 (05-27-19 @ 14:26)  RR: 16 (05-27-19 @ 14:26)  SpO2: 99% (05-27-19 @ 14:26)  Wt(kg): --    05-26 @ 07:01  -  05-27 @ 07:00  --------------------------------------------------------  IN: 540 mL / OUT: 0 mL / NET: 540 mL          PHYSICAL EXAM:  Constitutional: NAD  HEENT: anicteric sclera, oropharynx clear, MMM  Neck: No JVD  Respiratory: CTAB, no wheezes, rales or rhonchi  Cardiovascular: S1, S2, RRR  Gastrointestinal: BS+, soft, NT/ND  Extremities: No cyanosis or clubbing. No peripheral edema  Neurological: A/O x 3, no focal deficits  Psychiatric: Normal mood, normal affect  : No CVA tenderness. No pham.   Skin: No rashes       LABS:  05-27    132<L>  |  97<L>  |  44<H>  ----------------------------<  194<H>  4.1   |  24  |  1.85<H>    Ca    8.7      27 May 2019 04:26  Mg     2.3     05-27      Creatinine Trend: 1.85 <--, 1.59 <--, 1.22 <--, 1.13 <--, 1.13 <--                        13.6   6.77  )-----------( 216      ( 27 May 2019 04:26 )             42.2     Urine Studies:        RADIOLOGY & ADDITIONAL STUDIES:

## 2019-05-28 LAB
ANION GAP SERPL CALC-SCNC: 13 MMO/L — SIGNIFICANT CHANGE UP (ref 7–14)
BASOPHILS # BLD AUTO: 0.02 K/UL — SIGNIFICANT CHANGE UP (ref 0–0.2)
BASOPHILS NFR BLD AUTO: 0.4 % — SIGNIFICANT CHANGE UP (ref 0–2)
BUN SERPL-MCNC: 38 MG/DL — HIGH (ref 7–23)
CALCIUM SERPL-MCNC: 8.5 MG/DL — SIGNIFICANT CHANGE UP (ref 8.4–10.5)
CHLORIDE SERPL-SCNC: 102 MMOL/L — SIGNIFICANT CHANGE UP (ref 98–107)
CO2 SERPL-SCNC: 22 MMOL/L — SIGNIFICANT CHANGE UP (ref 22–31)
CREAT SERPL-MCNC: 1.52 MG/DL — HIGH (ref 0.5–1.3)
EOSINOPHIL # BLD AUTO: 0.22 K/UL — SIGNIFICANT CHANGE UP (ref 0–0.5)
EOSINOPHIL NFR BLD AUTO: 4.2 % — SIGNIFICANT CHANGE UP (ref 0–6)
GLUCOSE SERPL-MCNC: 98 MG/DL — SIGNIFICANT CHANGE UP (ref 70–99)
HCT VFR BLD CALC: 38.7 % — LOW (ref 39–50)
HCT VFR BLD CALC: 38.7 % — LOW (ref 39–50)
HCV RNA FLD QL NAA+PROBE: SIGNIFICANT CHANGE UP
HCV RNA SERPL NAA DL=5-ACNC: HIGH IU/ML
HCV RNA SPEC NAA+PROBE-LOG IU: 6.07 LOGIU/ML — HIGH
HCV RNA SPEC QL PROBE+SIG AMP: DETECTED — SIGNIFICANT CHANGE UP
HGB BLD-MCNC: 12.7 G/DL — LOW (ref 13–17)
HGB BLD-MCNC: 12.7 G/DL — LOW (ref 13–17)
IMM GRANULOCYTES NFR BLD AUTO: 0.4 % — SIGNIFICANT CHANGE UP (ref 0–1.5)
LYMPHOCYTES # BLD AUTO: 1.49 K/UL — SIGNIFICANT CHANGE UP (ref 1–3.3)
LYMPHOCYTES # BLD AUTO: 28.7 % — SIGNIFICANT CHANGE UP (ref 13–44)
MAGNESIUM SERPL-MCNC: 2.1 MG/DL — SIGNIFICANT CHANGE UP (ref 1.6–2.6)
MCHC RBC-ENTMCNC: 31.7 PG — SIGNIFICANT CHANGE UP (ref 27–34)
MCHC RBC-ENTMCNC: 31.7 PG — SIGNIFICANT CHANGE UP (ref 27–34)
MCHC RBC-ENTMCNC: 32.8 % — SIGNIFICANT CHANGE UP (ref 32–36)
MCHC RBC-ENTMCNC: 32.8 % — SIGNIFICANT CHANGE UP (ref 32–36)
MCV RBC AUTO: 96.5 FL — SIGNIFICANT CHANGE UP (ref 80–100)
MCV RBC AUTO: 96.5 FL — SIGNIFICANT CHANGE UP (ref 80–100)
MONOCYTES # BLD AUTO: 0.68 K/UL — SIGNIFICANT CHANGE UP (ref 0–0.9)
MONOCYTES NFR BLD AUTO: 13.1 % — SIGNIFICANT CHANGE UP (ref 2–14)
NEUTROPHILS # BLD AUTO: 2.76 K/UL — SIGNIFICANT CHANGE UP (ref 1.8–7.4)
NEUTROPHILS NFR BLD AUTO: 53.2 % — SIGNIFICANT CHANGE UP (ref 43–77)
NRBC # FLD: 0 K/UL — SIGNIFICANT CHANGE UP (ref 0–0)
NRBC # FLD: 0 K/UL — SIGNIFICANT CHANGE UP (ref 0–0)
PLATELET # BLD AUTO: 212 K/UL — SIGNIFICANT CHANGE UP (ref 150–400)
PLATELET # BLD AUTO: 212 K/UL — SIGNIFICANT CHANGE UP (ref 150–400)
PMV BLD: 9.7 FL — SIGNIFICANT CHANGE UP (ref 7–13)
PMV BLD: 9.7 FL — SIGNIFICANT CHANGE UP (ref 7–13)
POTASSIUM SERPL-MCNC: 4.3 MMOL/L — SIGNIFICANT CHANGE UP (ref 3.5–5.3)
POTASSIUM SERPL-SCNC: 4.3 MMOL/L — SIGNIFICANT CHANGE UP (ref 3.5–5.3)
RBC # BLD: 4.01 M/UL — LOW (ref 4.2–5.8)
RBC # BLD: 4.01 M/UL — LOW (ref 4.2–5.8)
RBC # FLD: 11.9 % — SIGNIFICANT CHANGE UP (ref 10.3–14.5)
RBC # FLD: 11.9 % — SIGNIFICANT CHANGE UP (ref 10.3–14.5)
SODIUM SERPL-SCNC: 137 MMOL/L — SIGNIFICANT CHANGE UP (ref 135–145)
WBC # BLD: 5.19 K/UL — SIGNIFICANT CHANGE UP (ref 3.8–10.5)
WBC # BLD: 5.19 K/UL — SIGNIFICANT CHANGE UP (ref 3.8–10.5)
WBC # FLD AUTO: 5.19 K/UL — SIGNIFICANT CHANGE UP (ref 3.8–10.5)
WBC # FLD AUTO: 5.19 K/UL — SIGNIFICANT CHANGE UP (ref 3.8–10.5)

## 2019-05-28 PROCEDURE — 76770 US EXAM ABDO BACK WALL COMP: CPT | Mod: 26

## 2019-05-28 RX ADMIN — GABAPENTIN 600 MILLIGRAM(S): 400 CAPSULE ORAL at 23:49

## 2019-05-28 RX ADMIN — SODIUM CHLORIDE 3 MILLILITER(S): 9 INJECTION INTRAMUSCULAR; INTRAVENOUS; SUBCUTANEOUS at 13:34

## 2019-05-28 RX ADMIN — ATORVASTATIN CALCIUM 40 MILLIGRAM(S): 80 TABLET, FILM COATED ORAL at 23:49

## 2019-05-28 RX ADMIN — Medication 100 MILLIGRAM(S): at 23:49

## 2019-05-28 RX ADMIN — SODIUM CHLORIDE 3 MILLILITER(S): 9 INJECTION INTRAMUSCULAR; INTRAVENOUS; SUBCUTANEOUS at 22:11

## 2019-05-28 RX ADMIN — Medication 100 MILLIGRAM(S): at 13:43

## 2019-05-28 RX ADMIN — GABAPENTIN 600 MILLIGRAM(S): 400 CAPSULE ORAL at 05:47

## 2019-05-28 RX ADMIN — AMLODIPINE BESYLATE 10 MILLIGRAM(S): 2.5 TABLET ORAL at 05:47

## 2019-05-28 RX ADMIN — HEPARIN SODIUM 5000 UNIT(S): 5000 INJECTION INTRAVENOUS; SUBCUTANEOUS at 17:17

## 2019-05-28 RX ADMIN — FAMOTIDINE 20 MILLIGRAM(S): 10 INJECTION INTRAVENOUS at 09:33

## 2019-05-28 RX ADMIN — GABAPENTIN 600 MILLIGRAM(S): 400 CAPSULE ORAL at 13:43

## 2019-05-28 RX ADMIN — HEPARIN SODIUM 5000 UNIT(S): 5000 INJECTION INTRAVENOUS; SUBCUTANEOUS at 05:47

## 2019-05-28 RX ADMIN — SODIUM CHLORIDE 3 MILLILITER(S): 9 INJECTION INTRAMUSCULAR; INTRAVENOUS; SUBCUTANEOUS at 05:47

## 2019-05-28 RX ADMIN — PANTOPRAZOLE SODIUM 40 MILLIGRAM(S): 20 TABLET, DELAYED RELEASE ORAL at 05:47

## 2019-05-28 RX ADMIN — INSULIN GLARGINE 50 UNIT(S): 100 INJECTION, SOLUTION SUBCUTANEOUS at 22:30

## 2019-05-28 RX ADMIN — Medication 81 MILLIGRAM(S): at 09:33

## 2019-05-28 RX ADMIN — Medication 100 MILLIGRAM(S): at 05:48

## 2019-05-28 NOTE — PROGRESS NOTE ADULT - ASSESSMENT
58M, obese ambulates with a cane, history of DM2, HTN, Neuropathy, old RLE Great toe ulcer x 3 years and L LE Plantar foot ulcer since 4/22/19 admitted to rule out ACS developed renal failure    A/P:  VICKY:  likely pre-renal (sec to uncontrolled DM with HCTZ), while on ARB. FEurea<35%  s/p off ARB last dose 05/26  Discontinue HCTZ (Received dose of 05/27)  s/p NS 75cc/hr for 1L  Better control of diabetes  renal function improving  Monitor renal function closely  Avoid Nephrotoxic meds    Hyponatremia:  Sec to uncontrolled DM + HCTZ  D/C HCTZ  better  Monitor Na level at present    HTN:  Controlled  Add Hydralazine if BP is high 58M, obese ambulates with a cane, history of DM2, HTN, Neuropathy, old RLE Great toe ulcer x 3 years and L LE Plantar foot ulcer since 4/22/19 admitted to rule out ACS developed renal failure    A/P:  VICKY:  likely pre-renal (sec to uncontrolled DM with HCTZ), while on ARB. FEurea<35%  s/p off ARB last dose 05/26  Discontinue HCTZ (Received dose of 05/27)  s/p NS 75cc/hr for 1L  Better control of diabetes  renal function improving  US normal   Monitor renal function closely  Avoid Nephrotoxic meds    Hyponatremia:  Sec to uncontrolled DM + HCTZ  D/C HCTZ  better  Monitor Na level at present    HTN:  Controlled  Add Hydralazine if BP is high    Proteinuria  check urine p/c ratio  likely sec to DM  ARB on hold sec to VICKY 58M, obese ambulates with a cane, history of DM2, HTN, Neuropathy, old RLE Great toe ulcer x 3 years and L LE Plantar foot ulcer since 4/22/19 admitted to rule out ACS developed renal failure    A/P:  VICKY:  likely pre-renal (sec to uncontrolled DM with HCTZ), while on ARB. FEurea<35%  s/p off ARB last dose 05/26  Discontinue HCTZ (Received dose of 05/27)  s/p NS 75cc/hr for 1L  Optimize control of diabetes  renal function improving  US normal   Monitor renal function closely  Avoid Nephrotoxic meds    Hyponatremia:  Sec to uncontrolled DM + HCTZ  D/C HCTZ  better  Monitor Na level at present    HTN:  Controlled  Add Hydralazine if BP is high    Proteinuria  check urine p/c ratio  likely sec to DM  ARB on hold sec to VICKY

## 2019-05-29 LAB
ANION GAP SERPL CALC-SCNC: 11 MMO/L — SIGNIFICANT CHANGE UP (ref 7–14)
BUN SERPL-MCNC: 30 MG/DL — HIGH (ref 7–23)
CALCIUM SERPL-MCNC: 9.4 MG/DL — SIGNIFICANT CHANGE UP (ref 8.4–10.5)
CHLORIDE SERPL-SCNC: 102 MMOL/L — SIGNIFICANT CHANGE UP (ref 98–107)
CO2 SERPL-SCNC: 25 MMOL/L — SIGNIFICANT CHANGE UP (ref 22–31)
CREAT ?TM UR-MCNC: 98.2 MG/DL — SIGNIFICANT CHANGE UP
CREAT SERPL-MCNC: 1.13 MG/DL — SIGNIFICANT CHANGE UP (ref 0.5–1.3)
GLUCOSE SERPL-MCNC: 117 MG/DL — HIGH (ref 70–99)
HCT VFR BLD CALC: 41.3 % — SIGNIFICANT CHANGE UP (ref 39–50)
HGB BLD-MCNC: 13.6 G/DL — SIGNIFICANT CHANGE UP (ref 13–17)
MCHC RBC-ENTMCNC: 31.8 PG — SIGNIFICANT CHANGE UP (ref 27–34)
MCHC RBC-ENTMCNC: 32.9 % — SIGNIFICANT CHANGE UP (ref 32–36)
MCV RBC AUTO: 96.5 FL — SIGNIFICANT CHANGE UP (ref 80–100)
NRBC # FLD: 0 K/UL — SIGNIFICANT CHANGE UP (ref 0–0)
PLATELET # BLD AUTO: 222 K/UL — SIGNIFICANT CHANGE UP (ref 150–400)
PMV BLD: 9.1 FL — SIGNIFICANT CHANGE UP (ref 7–13)
POTASSIUM SERPL-MCNC: 4.4 MMOL/L — SIGNIFICANT CHANGE UP (ref 3.5–5.3)
POTASSIUM SERPL-SCNC: 4.4 MMOL/L — SIGNIFICANT CHANGE UP (ref 3.5–5.3)
PROT UR-MCNC: 132.6 MG/DL — SIGNIFICANT CHANGE UP
RBC # BLD: 4.28 M/UL — SIGNIFICANT CHANGE UP (ref 4.2–5.8)
RBC # FLD: 12.1 % — SIGNIFICANT CHANGE UP (ref 10.3–14.5)
SODIUM SERPL-SCNC: 138 MMOL/L — SIGNIFICANT CHANGE UP (ref 135–145)
WBC # BLD: 5.14 K/UL — SIGNIFICANT CHANGE UP (ref 3.8–10.5)
WBC # FLD AUTO: 5.14 K/UL — SIGNIFICANT CHANGE UP (ref 3.8–10.5)

## 2019-05-29 RX ORDER — CHLORHEXIDINE GLUCONATE 213 G/1000ML
1 SOLUTION TOPICAL
Refills: 0 | Status: DISCONTINUED | OUTPATIENT
Start: 2019-05-29 | End: 2019-05-31

## 2019-05-29 RX ORDER — POLYETHYLENE GLYCOL 3350 17 G/17G
17 POWDER, FOR SOLUTION ORAL ONCE
Refills: 0 | Status: COMPLETED | OUTPATIENT
Start: 2019-05-29 | End: 2019-05-29

## 2019-05-29 RX ADMIN — SODIUM CHLORIDE 3 MILLILITER(S): 9 INJECTION INTRAMUSCULAR; INTRAVENOUS; SUBCUTANEOUS at 22:33

## 2019-05-29 RX ADMIN — Medication 81 MILLIGRAM(S): at 11:43

## 2019-05-29 RX ADMIN — Medication 100 MILLIGRAM(S): at 14:13

## 2019-05-29 RX ADMIN — ATORVASTATIN CALCIUM 40 MILLIGRAM(S): 80 TABLET, FILM COATED ORAL at 22:36

## 2019-05-29 RX ADMIN — POLYETHYLENE GLYCOL 3350 17 GRAM(S): 17 POWDER, FOR SOLUTION ORAL at 13:24

## 2019-05-29 RX ADMIN — GABAPENTIN 600 MILLIGRAM(S): 400 CAPSULE ORAL at 05:47

## 2019-05-29 RX ADMIN — SODIUM CHLORIDE 3 MILLILITER(S): 9 INJECTION INTRAMUSCULAR; INTRAVENOUS; SUBCUTANEOUS at 13:25

## 2019-05-29 RX ADMIN — AMLODIPINE BESYLATE 10 MILLIGRAM(S): 2.5 TABLET ORAL at 05:48

## 2019-05-29 RX ADMIN — HEPARIN SODIUM 5000 UNIT(S): 5000 INJECTION INTRAVENOUS; SUBCUTANEOUS at 17:21

## 2019-05-29 RX ADMIN — PANTOPRAZOLE SODIUM 40 MILLIGRAM(S): 20 TABLET, DELAYED RELEASE ORAL at 05:48

## 2019-05-29 RX ADMIN — CHLORHEXIDINE GLUCONATE 1 APPLICATION(S): 213 SOLUTION TOPICAL at 17:21

## 2019-05-29 RX ADMIN — GABAPENTIN 600 MILLIGRAM(S): 400 CAPSULE ORAL at 12:14

## 2019-05-29 RX ADMIN — INSULIN GLARGINE 50 UNIT(S): 100 INJECTION, SOLUTION SUBCUTANEOUS at 22:36

## 2019-05-29 RX ADMIN — GABAPENTIN 600 MILLIGRAM(S): 400 CAPSULE ORAL at 22:36

## 2019-05-29 RX ADMIN — HEPARIN SODIUM 5000 UNIT(S): 5000 INJECTION INTRAVENOUS; SUBCUTANEOUS at 05:48

## 2019-05-29 RX ADMIN — FAMOTIDINE 20 MILLIGRAM(S): 10 INJECTION INTRAVENOUS at 11:43

## 2019-05-29 RX ADMIN — Medication 100 MILLIGRAM(S): at 22:36

## 2019-05-29 RX ADMIN — Medication 100 MILLIGRAM(S): at 05:47

## 2019-05-29 RX ADMIN — SODIUM CHLORIDE 3 MILLILITER(S): 9 INJECTION INTRAMUSCULAR; INTRAVENOUS; SUBCUTANEOUS at 05:44

## 2019-05-29 NOTE — PROGRESS NOTE ADULT - ASSESSMENT
58M, obese ambulates with a cane, history of DM2, HTN, Neuropathy, old RLE Great toe ulcer x 3 years and L LE Plantar foot ulcer since 4/22/19 admitted to rule out ACS developed renal failure    A/P:  VICKY:  likely pre-renal (sec to uncontrolled DM with HCTZ), while on ARB. FEurea<35%  s/p off ARB last dose 05/26  Discontinue HCTZ (Received dose of 05/27)  s/p NS 75cc/hr for 1L  Optimize control of diabetes  renal function improving  US normal   Monitor renal function closely  Avoid Nephrotoxic meds    Hyponatremia:  Sec to uncontrolled DM + HCTZ  D/C HCTZ  better  Monitor Na level at present    HTN:  Controlled  Add Hydralazine if BP is high    Proteinuria  urine p/c ratio 1.3  likely sec to DM  ARB on hold sec to VICKY

## 2019-05-30 LAB
ANION GAP SERPL CALC-SCNC: 10 MMO/L — SIGNIFICANT CHANGE UP (ref 7–14)
BUN SERPL-MCNC: 24 MG/DL — HIGH (ref 7–23)
CALCIUM SERPL-MCNC: 9.5 MG/DL — SIGNIFICANT CHANGE UP (ref 8.4–10.5)
CHLORIDE SERPL-SCNC: 102 MMOL/L — SIGNIFICANT CHANGE UP (ref 98–107)
CO2 SERPL-SCNC: 26 MMOL/L — SIGNIFICANT CHANGE UP (ref 22–31)
CREAT SERPL-MCNC: 1.07 MG/DL — SIGNIFICANT CHANGE UP (ref 0.5–1.3)
GLUCOSE SERPL-MCNC: 97 MG/DL — SIGNIFICANT CHANGE UP (ref 70–99)
HCT VFR BLD CALC: 41.7 % — SIGNIFICANT CHANGE UP (ref 39–50)
HGB BLD-MCNC: 13.4 G/DL — SIGNIFICANT CHANGE UP (ref 13–17)
MAGNESIUM SERPL-MCNC: 2 MG/DL — SIGNIFICANT CHANGE UP (ref 1.6–2.6)
MCHC RBC-ENTMCNC: 31.7 PG — SIGNIFICANT CHANGE UP (ref 27–34)
MCHC RBC-ENTMCNC: 32.1 % — SIGNIFICANT CHANGE UP (ref 32–36)
MCV RBC AUTO: 98.6 FL — SIGNIFICANT CHANGE UP (ref 80–100)
NRBC # FLD: 0 K/UL — SIGNIFICANT CHANGE UP (ref 0–0)
PLATELET # BLD AUTO: 232 K/UL — SIGNIFICANT CHANGE UP (ref 150–400)
PMV BLD: 9.5 FL — SIGNIFICANT CHANGE UP (ref 7–13)
POTASSIUM SERPL-MCNC: 4.3 MMOL/L — SIGNIFICANT CHANGE UP (ref 3.5–5.3)
POTASSIUM SERPL-SCNC: 4.3 MMOL/L — SIGNIFICANT CHANGE UP (ref 3.5–5.3)
RBC # BLD: 4.23 M/UL — SIGNIFICANT CHANGE UP (ref 4.2–5.8)
RBC # FLD: 11.9 % — SIGNIFICANT CHANGE UP (ref 10.3–14.5)
SODIUM SERPL-SCNC: 138 MMOL/L — SIGNIFICANT CHANGE UP (ref 135–145)
WBC # BLD: 5.86 K/UL — SIGNIFICANT CHANGE UP (ref 3.8–10.5)
WBC # FLD AUTO: 5.86 K/UL — SIGNIFICANT CHANGE UP (ref 3.8–10.5)

## 2019-05-30 RX ADMIN — AMLODIPINE BESYLATE 10 MILLIGRAM(S): 2.5 TABLET ORAL at 06:14

## 2019-05-30 RX ADMIN — Medication 100 MILLIGRAM(S): at 14:50

## 2019-05-30 RX ADMIN — FAMOTIDINE 20 MILLIGRAM(S): 10 INJECTION INTRAVENOUS at 14:50

## 2019-05-30 RX ADMIN — CHLORHEXIDINE GLUCONATE 1 APPLICATION(S): 213 SOLUTION TOPICAL at 17:47

## 2019-05-30 RX ADMIN — HEPARIN SODIUM 5000 UNIT(S): 5000 INJECTION INTRAVENOUS; SUBCUTANEOUS at 17:46

## 2019-05-30 RX ADMIN — GABAPENTIN 600 MILLIGRAM(S): 400 CAPSULE ORAL at 06:15

## 2019-05-30 RX ADMIN — SODIUM CHLORIDE 3 MILLILITER(S): 9 INJECTION INTRAMUSCULAR; INTRAVENOUS; SUBCUTANEOUS at 17:07

## 2019-05-30 RX ADMIN — Medication 100 MILLIGRAM(S): at 21:52

## 2019-05-30 RX ADMIN — SODIUM CHLORIDE 3 MILLILITER(S): 9 INJECTION INTRAMUSCULAR; INTRAVENOUS; SUBCUTANEOUS at 06:15

## 2019-05-30 RX ADMIN — CHLORHEXIDINE GLUCONATE 1 APPLICATION(S): 213 SOLUTION TOPICAL at 06:15

## 2019-05-30 RX ADMIN — Medication 100 MILLIGRAM(S): at 06:14

## 2019-05-30 RX ADMIN — HEPARIN SODIUM 5000 UNIT(S): 5000 INJECTION INTRAVENOUS; SUBCUTANEOUS at 06:15

## 2019-05-30 RX ADMIN — Medication 2: at 17:47

## 2019-05-30 RX ADMIN — PANTOPRAZOLE SODIUM 40 MILLIGRAM(S): 20 TABLET, DELAYED RELEASE ORAL at 06:15

## 2019-05-30 RX ADMIN — SODIUM CHLORIDE 3 MILLILITER(S): 9 INJECTION INTRAMUSCULAR; INTRAVENOUS; SUBCUTANEOUS at 21:57

## 2019-05-30 RX ADMIN — Medication 81 MILLIGRAM(S): at 14:50

## 2019-05-30 RX ADMIN — INSULIN GLARGINE 50 UNIT(S): 100 INJECTION, SOLUTION SUBCUTANEOUS at 21:53

## 2019-05-30 RX ADMIN — GABAPENTIN 600 MILLIGRAM(S): 400 CAPSULE ORAL at 21:52

## 2019-05-30 RX ADMIN — ATORVASTATIN CALCIUM 40 MILLIGRAM(S): 80 TABLET, FILM COATED ORAL at 21:52

## 2019-05-30 RX ADMIN — GABAPENTIN 600 MILLIGRAM(S): 400 CAPSULE ORAL at 14:50

## 2019-05-30 NOTE — PROGRESS NOTE ADULT - ASSESSMENT
58M, obese ambulates with a cane, history of DM2, HTN, Neuropathy, old RLE Great toe ulcer x 3 years and L LE Plantar foot ulcer since 4/22/19 admitted to rule out ACS developed renal failure    A/P:  VICKY:  likely pre-renal (sec to uncontrolled DM with HCTZ), while on ARB. FEurea<35%  s/p off ARB last dose 05/26  Discontinue HCTZ (Received dose of 05/27)  s/p NS 75cc/hr for 1L  Optimize control of diabetes  renal function improved  US normal   Monitor renal function closely  Avoid Nephrotoxic meds    Hyponatremia:  Sec to uncontrolled DM + HCTZ  D/C HCTZ  better  Monitor Na level at present    HTN:  Controlled  continue norvasc  monitor    Proteinuria  urine p/c ratio 1.3  likely sec to DM  ARB on hold sec to VICKY 58M, obese ambulates with a cane, history of DM2, HTN, Neuropathy, old RLE Great toe ulcer x 3 years and L LE Plantar foot ulcer since 4/22/19 admitted to rule out ACS developed renal failure    A/P:  VICKY:  likely pre-renal (sec to uncontrolled DM with HCTZ), while on ARB. FEurea<35%  s/p off ARB last dose 05/26  Discontinue HCTZ (Received dose of 05/27)  s/p NS 75cc/hr for 1L  Optimize control of diabetes  renal function improved  US normal   Monitor renal function closely  Avoid Nephrotoxic meds    Hyponatremia:  Sec to uncontrolled DM + HCTZ  D/C HCTZ  better  Monitor Na level at present    HTN:  Controlled  continue norvasc  monitor    Proteinuria  urine p/c ratio 1.3  likely sec to DM  also +hep c, pending work up  ARB on hold sec to VICKY  monitor  outpatient follow up

## 2019-05-31 VITALS
DIASTOLIC BLOOD PRESSURE: 82 MMHG | TEMPERATURE: 98 F | HEART RATE: 74 BPM | OXYGEN SATURATION: 100 % | SYSTOLIC BLOOD PRESSURE: 131 MMHG | RESPIRATION RATE: 18 BRPM

## 2019-05-31 DIAGNOSIS — B19.20 UNSPECIFIED VIRAL HEPATITIS C WITHOUT HEPATIC COMA: ICD-10-CM

## 2019-05-31 LAB
ANION GAP SERPL CALC-SCNC: 14 MMO/L — SIGNIFICANT CHANGE UP (ref 7–14)
BUN SERPL-MCNC: 22 MG/DL — SIGNIFICANT CHANGE UP (ref 7–23)
CALCIUM SERPL-MCNC: 8.8 MG/DL — SIGNIFICANT CHANGE UP (ref 8.4–10.5)
CHLORIDE SERPL-SCNC: 102 MMOL/L — SIGNIFICANT CHANGE UP (ref 98–107)
CO2 SERPL-SCNC: 20 MMOL/L — LOW (ref 22–31)
CREAT SERPL-MCNC: 0.95 MG/DL — SIGNIFICANT CHANGE UP (ref 0.5–1.3)
GLUCOSE SERPL-MCNC: 62 MG/DL — LOW (ref 70–99)
HCT VFR BLD CALC: 41 % — SIGNIFICANT CHANGE UP (ref 39–50)
HGB BLD-MCNC: 13.1 G/DL — SIGNIFICANT CHANGE UP (ref 13–17)
MAGNESIUM SERPL-MCNC: 2 MG/DL — SIGNIFICANT CHANGE UP (ref 1.6–2.6)
MCHC RBC-ENTMCNC: 31.6 PG — SIGNIFICANT CHANGE UP (ref 27–34)
MCHC RBC-ENTMCNC: 32 % — SIGNIFICANT CHANGE UP (ref 32–36)
MCV RBC AUTO: 99 FL — SIGNIFICANT CHANGE UP (ref 80–100)
NRBC # FLD: 0 K/UL — SIGNIFICANT CHANGE UP (ref 0–0)
PLATELET # BLD AUTO: 231 K/UL — SIGNIFICANT CHANGE UP (ref 150–400)
PMV BLD: 9.4 FL — SIGNIFICANT CHANGE UP (ref 7–13)
POTASSIUM SERPL-MCNC: 5 MMOL/L — SIGNIFICANT CHANGE UP (ref 3.5–5.3)
POTASSIUM SERPL-SCNC: 5 MMOL/L — SIGNIFICANT CHANGE UP (ref 3.5–5.3)
RBC # BLD: 4.14 M/UL — LOW (ref 4.2–5.8)
RBC # FLD: 12 % — SIGNIFICANT CHANGE UP (ref 10.3–14.5)
SODIUM SERPL-SCNC: 136 MMOL/L — SIGNIFICANT CHANGE UP (ref 135–145)
WBC # BLD: 5.68 K/UL — SIGNIFICANT CHANGE UP (ref 3.8–10.5)
WBC # FLD AUTO: 5.68 K/UL — SIGNIFICANT CHANGE UP (ref 3.8–10.5)

## 2019-05-31 RX ORDER — CEFAZOLIN SODIUM 1 G
0 VIAL (EA) INJECTION
Qty: 0 | Refills: 0 | DISCHARGE

## 2019-05-31 RX ORDER — ASPIRIN/CALCIUM CARB/MAGNESIUM 324 MG
1 TABLET ORAL
Qty: 0 | Refills: 0 | DISCHARGE
Start: 2019-05-31

## 2019-05-31 RX ORDER — ATORVASTATIN CALCIUM 80 MG/1
1 TABLET, FILM COATED ORAL
Qty: 0 | Refills: 0 | DISCHARGE
Start: 2019-05-31

## 2019-05-31 RX ORDER — CEFAZOLIN SODIUM 1 G
1 VIAL (EA) INJECTION
Qty: 19 | Refills: 0
Start: 2019-05-31 | End: 2019-06-06

## 2019-05-31 RX ORDER — ATORVASTATIN CALCIUM 80 MG/1
1 TABLET, FILM COATED ORAL
Qty: 30 | Refills: 0
Start: 2019-05-31 | End: 2019-06-29

## 2019-05-31 RX ORDER — OLMESARTAN MEDOXOMIL 5 MG/1
1 TABLET, FILM COATED ORAL
Qty: 0 | Refills: 0 | DISCHARGE

## 2019-05-31 RX ADMIN — PANTOPRAZOLE SODIUM 40 MILLIGRAM(S): 20 TABLET, DELAYED RELEASE ORAL at 05:15

## 2019-05-31 RX ADMIN — Medication 81 MILLIGRAM(S): at 08:39

## 2019-05-31 RX ADMIN — GABAPENTIN 600 MILLIGRAM(S): 400 CAPSULE ORAL at 13:00

## 2019-05-31 RX ADMIN — Medication 100 MILLIGRAM(S): at 05:16

## 2019-05-31 RX ADMIN — GABAPENTIN 600 MILLIGRAM(S): 400 CAPSULE ORAL at 05:15

## 2019-05-31 RX ADMIN — FAMOTIDINE 20 MILLIGRAM(S): 10 INJECTION INTRAVENOUS at 08:39

## 2019-05-31 RX ADMIN — SODIUM CHLORIDE 3 MILLILITER(S): 9 INJECTION INTRAMUSCULAR; INTRAVENOUS; SUBCUTANEOUS at 13:02

## 2019-05-31 RX ADMIN — CHLORHEXIDINE GLUCONATE 1 APPLICATION(S): 213 SOLUTION TOPICAL at 05:16

## 2019-05-31 RX ADMIN — Medication 2: at 08:39

## 2019-05-31 RX ADMIN — AMLODIPINE BESYLATE 10 MILLIGRAM(S): 2.5 TABLET ORAL at 05:15

## 2019-05-31 RX ADMIN — HEPARIN SODIUM 5000 UNIT(S): 5000 INJECTION INTRAVENOUS; SUBCUTANEOUS at 05:16

## 2019-05-31 RX ADMIN — SODIUM CHLORIDE 3 MILLILITER(S): 9 INJECTION INTRAMUSCULAR; INTRAVENOUS; SUBCUTANEOUS at 05:16

## 2019-05-31 RX ADMIN — Medication 100 MILLIGRAM(S): at 13:00

## 2019-05-31 NOTE — DIETITIAN INITIAL EVALUATION ADULT. - NS AS NUTRI INTERV MEALS SNACK
1. Suggest: PO diet rx: Regular, Consistent Carbohydrate (no snacks), DASH/TLC (cholesterol and sodium restricted), Low Fat;               2. Encourage & assist Pt with meals; Monitor PO diet tolerance;                 3. Monitor labs, weekly weights, hydration status;               4. Suggest outpatient follow up with an endocrinologist to ensure long-term DM diet comprehension and compliance. Suggest outpatient follow up with appropriate RD for the purposes of long-term nutrition evaluation and diet education;/Other (specify)/Diets modified for specific foods and ingredients

## 2019-05-31 NOTE — DISCHARGE NOTE NURSING/CASE MANAGEMENT/SOCIAL WORK - NSDCDPATPORTLINK_GEN_ALL_CORE
You can access the [facility] Patient Portal, offered by [facility], by registering with the following website: [email or web address]

## 2019-05-31 NOTE — PROGRESS NOTE ADULT - SUBJECTIVE AND OBJECTIVE BOX
CHAUNCEY PRATER:[id number],   58yMale followed for:  No Known Allergies    PAST MEDICAL & SURGICAL HISTORY:  Diabetic foot ulcers  Chronic GERD  Diabetes mellitus, type 2  Hypertension  S/P cholecystectomy  PICC (peripherally inserted central catheter) in place    FAMILY HISTORY:  FH: type 2 diabetes  FH: hypertension    MEDICATIONS  (STANDING):  amLODIPine   Tablet 10 milliGRAM(s) Oral daily  aspirin enteric coated 81 milliGRAM(s) Oral daily  atorvastatin 40 milliGRAM(s) Oral at bedtime  ceFAZolin   IVPB 1000 milliGRAM(s) IV Intermittent every 8 hours  chlorhexidine 4% Liquid 1 Application(s) Topical two times a day  dextrose 5%. 1000 milliLiter(s) (50 mL/Hr) IV Continuous <Continuous>  dextrose 50% Injectable 12.5 Gram(s) IV Push once  dextrose 50% Injectable 25 Gram(s) IV Push once  dextrose 50% Injectable 25 Gram(s) IV Push once  famotidine    Tablet 20 milliGRAM(s) Oral daily  gabapentin 600 milliGRAM(s) Oral three times a day  heparin  Injectable 5000 Unit(s) SubCutaneous every 12 hours  insulin glargine Injectable (LANTUS) 50 Unit(s) SubCutaneous at bedtime  insulin lispro (HumaLOG) corrective regimen sliding scale   SubCutaneous three times a day before meals  insulin lispro (HumaLOG) corrective regimen sliding scale   SubCutaneous at bedtime  pantoprazole    Tablet 40 milliGRAM(s) Oral before breakfast  sodium chloride 0.9% lock flush 3 milliLiter(s) IV Push every 8 hours    MEDICATIONS  (PRN):  dextrose 40% Gel 15 Gram(s) Oral once PRN Blood Glucose LESS THAN 70 milliGRAM(s)/deciliter  glucagon  Injectable 1 milliGRAM(s) IntraMuscular once PRN Glucose LESS THAN 70 milligrams/deciliter      Vital Signs Last 24 Hrs  T(C): 36.7 (30 May 2019 06:11), Max: 36.8 (29 May 2019 12:28)  T(F): 98.1 (30 May 2019 06:11), Max: 98.3 (29 May 2019 12:28)  HR: 78 (30 May 2019 06:11) (70 - 78)  BP: 132/78 (30 May 2019 06:11) (127/86 - 139/72)  BP(mean): --  RR: 18 (30 May 2019 06:11) (17 - 18)  SpO2: 99% (30 May 2019 06:11) (97% - 99%)  nc/at  s1s2  cta  soft, nt, nd no guarding or rebound  no c/c/e    CBC Full  -  ( 30 May 2019 06:00 )  WBC Count : 5.86 K/uL  RBC Count : 4.23 M/uL  Hemoglobin : 13.4 g/dL  Hematocrit : 41.7 %  Platelet Count - Automated : 232 K/uL  Mean Cell Volume : 98.6 fL  Mean Cell Hemoglobin : 31.7 pg  Mean Cell Hemoglobin Concentration : 32.1 %  Auto Neutrophil # : x  Auto Lymphocyte # : x  Auto Monocyte # : x  Auto Eosinophil # : x  Auto Basophil # : x  Auto Neutrophil % : x  Auto Lymphocyte % : x  Auto Monocyte % : x  Auto Eosinophil % : x  Auto Basophil % : x    05-30    138  |  102  |  24<H>  ----------------------------<  97  4.3   |  26  |  1.07    Ca    9.5      30 May 2019 06:00  Mg     2.0     05-30
Cardiovascular Disease Progress Note    Overnight events: No acute events overnight. Mr. Tristna denies chest pain or SOB.    Otherwise review of systems negative    Objective Findings:  T(C): 36.8 (05-28-19 @ 05:45), Max: 37 (05-27-19 @ 14:26)  HR: 69 (05-28-19 @ 05:45) (69 - 75)  BP: 129/76 (05-28-19 @ 05:45) (113/78 - 129/76)  RR: 16 (05-28-19 @ 05:45) (16 - 18)  SpO2: 100% (05-28-19 @ 05:45) (99% - 100%)  Wt(kg): --  Daily     Daily       Physical Exam:  Gen: NAD  HEENT: EOMI  CV: RRR, normal S1 + S2, no m/r/g  Lungs: CTAB  Abd: soft, non-tender  Ext: No edema    Telemetry: Sinus    Laboratory Data:                        12.7   5.19  )-----------( 212      ( 28 May 2019 07:35 )             38.7     05-28    137  |  102  |  38<H>  ----------------------------<  98  4.3   |  22  |  1.52<H>    Ca    8.5      28 May 2019 07:35  Mg     2.1     05-28                Inpatient Medications:  MEDICATIONS  (STANDING):  amLODIPine   Tablet 10 milliGRAM(s) Oral daily  aspirin enteric coated 81 milliGRAM(s) Oral daily  atorvastatin 40 milliGRAM(s) Oral at bedtime  ceFAZolin   IVPB 1000 milliGRAM(s) IV Intermittent every 8 hours  dextrose 5%. 1000 milliLiter(s) (50 mL/Hr) IV Continuous <Continuous>  dextrose 50% Injectable 12.5 Gram(s) IV Push once  dextrose 50% Injectable 25 Gram(s) IV Push once  dextrose 50% Injectable 25 Gram(s) IV Push once  famotidine    Tablet 20 milliGRAM(s) Oral daily  gabapentin 600 milliGRAM(s) Oral three times a day  heparin  Injectable 5000 Unit(s) SubCutaneous every 12 hours  insulin glargine Injectable (LANTUS) 50 Unit(s) SubCutaneous at bedtime  insulin lispro (HumaLOG) corrective regimen sliding scale   SubCutaneous three times a day before meals  insulin lispro (HumaLOG) corrective regimen sliding scale   SubCutaneous at bedtime  pantoprazole    Tablet 40 milliGRAM(s) Oral before breakfast  sodium chloride 0.9% lock flush 3 milliLiter(s) IV Push every 8 hours    Assessment: 58 year old man with HTN, uncontrolled IDDM, 1st degree AV block and PVD presents with angina.    #Angina-  Nuclear stress test with no evidence of inducible ischemia.  Continue current management.     #Creatinine elevation-  Likely pre-renal.  Renal evaluation appreciated.     #Hepatitis C-  Newly diagnosed.  Awaiting PCR RNA.  GI evaluation appreciated.     #Diabetic foot ulcers-  s/pt IR for PICC line replacement.  Ancef through June 6th 2019.  Wound care.    #HTN-   BP acceptable on current regimen.      Over 25 minutes spent on total encounter; more than 50% of the visit was spent counseling and/or coordinating care by the attending physician.      Dallas Ball MD [facility]  Cardiovascular Disease  [phone]
[facility] [place] [facility]   MD TAB PHELPS MD ANGELA WONG, PA    TEL:  OFFICE: [phone]  DR RODRIGUEZ CELL: [phone]  DR. JAIME CELL: [phone]  AMADOR LARA CELL: [phone]        Patient is a 58y old  Male who presents with a chief complaint of Possible L UE PICC Line dislodge x 1 day (30 May 2019 09:35)      Patient seen and examined at bedside. No chest pain/sob    VITALS:  T(F): 98.1 (05-30-19 @ 06:11), Max: 98.1 (05-30-19 @ 06:11)  HR: 78 (05-30-19 @ 06:11)  BP: 132/78 (05-30-19 @ 06:11)  RR: 18 (05-30-19 @ 06:11)  SpO2: 99% (05-30-19 @ 06:11)  Wt(kg): --    05-30 @ 07:01  -  05-30 @ 13:39  --------------------------------------------------------  IN: 200 mL / OUT: 0 mL / NET: 200 mL          PHYSICAL EXAM:  Constitutional: NAD  Neck: No JVD  Respiratory: CTAB, no wheezes, rales or rhonchi  Cardiovascular: S1, S2, RRR  Gastrointestinal: BS+, soft, NT/ND  Extremities: No peripheral edema    Hospital Medications:   MEDICATIONS  (STANDING):  amLODIPine   Tablet 10 milliGRAM(s) Oral daily  aspirin enteric coated 81 milliGRAM(s) Oral daily  atorvastatin 40 milliGRAM(s) Oral at bedtime  ceFAZolin   IVPB 1000 milliGRAM(s) IV Intermittent every 8 hours  chlorhexidine 4% Liquid 1 Application(s) Topical two times a day  dextrose 5%. 1000 milliLiter(s) (50 mL/Hr) IV Continuous <Continuous>  dextrose 50% Injectable 12.5 Gram(s) IV Push once  dextrose 50% Injectable 25 Gram(s) IV Push once  dextrose 50% Injectable 25 Gram(s) IV Push once  famotidine    Tablet 20 milliGRAM(s) Oral daily  gabapentin 600 milliGRAM(s) Oral three times a day  heparin  Injectable 5000 Unit(s) SubCutaneous every 12 hours  insulin glargine Injectable (LANTUS) 50 Unit(s) SubCutaneous at bedtime  insulin lispro (HumaLOG) corrective regimen sliding scale   SubCutaneous three times a day before meals  insulin lispro (HumaLOG) corrective regimen sliding scale   SubCutaneous at bedtime  pantoprazole    Tablet 40 milliGRAM(s) Oral before breakfast  sodium chloride 0.9% lock flush 3 milliLiter(s) IV Push every 8 hours      LABS:  05-30    138  |  102  |  24<H>  ----------------------------<  97  4.3   |  26  |  1.07    Ca    9.5      30 May 2019 06:00  Mg     2.0     05-30      Creatinine Trend: 1.07 <--, 1.13 <--, 1.52 <--, 1.85 <--, 1.59 <--, 1.22 <--, 1.13 <--, 1.13 <--                                13.4   5.86  )-----------( 232      ( 30 May 2019 06:00 )             41.7     Urine Studies:  Urinalysis - [05-27-19 @ 17:48]      Color LIGHT YELLOW / Appearance CLEAR / SG 1.016 / pH 6.0      Gluc NEGATIVE / Ketone NEGATIVE  / Bili NEGATIVE / Urobili NORMAL       Blood NEGATIVE / Protein 100 / Leuk Est NEGATIVE / Nitrite NEGATIVE      RBC 0-2 / WBC 0-2 / Hyaline NEGATIVE / Gran  / Sq Epi OCC / Non Sq Epi  / Bacteria NEGATIVE    Urine Creatinine 98.20      [05-29-19 @ 00:30]  Urine Protein 132.6      [05-29-19 @ 00:30]  Urine Urea Nitrogen 733.1      [05-27-19 @ 17:48]    HbA1c 9.0      [05-24-19 @ 05:10]  TSH 2.29      [05-24-19 @ 05:10]  Lipid: chol 128, , HDL 37, LDL 79      [05-24-19 @ 05:10]    HCV 15.53, Reactive      [05-25-19 @ 03:12]      RADIOLOGY & ADDITIONAL STUDIES:
[facility] [place] [facility]   MD TAB PHELPS MD ANGELA WONG, PA    TEL:  OFFICE: [phone]  DR RODRIGUEZ CELL: [phone]  DR. JAIME CELL: [phone]  AMADOR LARA CELL: [phone]        Patient is a 58y old  Male who presents with a chief complaint of Possible L UE PICC Line dislodge x 1 day (31 May 2019 10:35)      Patient seen and examined at bedside. No chest pain/sob    VITALS:  T(F): 98.4 (05-31-19 @ 12:49), Max: 98.4 (05-31-19 @ 12:49)  HR: 74 (05-31-19 @ 12:49)  BP: 131/82 (05-31-19 @ 12:49)  RR: 18 (05-31-19 @ 12:49)  SpO2: 100% (05-31-19 @ 12:49)  Wt(kg): --    05-30 @ 07:01  -  05-31 @ 07:00  --------------------------------------------------------  IN: 520 mL / OUT: 0 mL / NET: 520 mL          PHYSICAL EXAM:  Constitutional: NAD  Neck: No JVD  Respiratory: CTAB, no wheezes, rales or rhonchi  Cardiovascular: S1, S2, RRR  Gastrointestinal: BS+, soft, NT/ND  Extremities: No peripheral edema    Hospital Medications:   MEDICATIONS  (STANDING):  amLODIPine   Tablet 10 milliGRAM(s) Oral daily  aspirin enteric coated 81 milliGRAM(s) Oral daily  atorvastatin 40 milliGRAM(s) Oral at bedtime  ceFAZolin   IVPB 1000 milliGRAM(s) IV Intermittent every 8 hours  chlorhexidine 4% Liquid 1 Application(s) Topical two times a day  dextrose 5%. 1000 milliLiter(s) (50 mL/Hr) IV Continuous <Continuous>  dextrose 50% Injectable 12.5 Gram(s) IV Push once  dextrose 50% Injectable 25 Gram(s) IV Push once  dextrose 50% Injectable 25 Gram(s) IV Push once  famotidine    Tablet 20 milliGRAM(s) Oral daily  gabapentin 600 milliGRAM(s) Oral three times a day  heparin  Injectable 5000 Unit(s) SubCutaneous every 12 hours  insulin glargine Injectable (LANTUS) 50 Unit(s) SubCutaneous at bedtime  insulin lispro (HumaLOG) corrective regimen sliding scale   SubCutaneous three times a day before meals  insulin lispro (HumaLOG) corrective regimen sliding scale   SubCutaneous at bedtime  pantoprazole    Tablet 40 milliGRAM(s) Oral before breakfast  sodium chloride 0.9% lock flush 3 milliLiter(s) IV Push every 8 hours      LABS:  05-31    136  |  102  |  22  ----------------------------<  62<L>  5.0   |  20<L>  |  0.95    Ca    8.8      31 May 2019 04:00  Mg     2.0     05-31      Creatinine Trend: 0.95 <--, 1.07 <--, 1.13 <--, 1.52 <--, 1.85 <--, 1.59 <--, 1.22 <--                                13.1   5.68  )-----------( 231      ( 31 May 2019 04:00 )             41.0     Urine Studies:  Urinalysis - [05-27-19 @ 17:48]      Color LIGHT YELLOW / Appearance CLEAR / SG 1.016 / pH 6.0      Gluc NEGATIVE / Ketone NEGATIVE  / Bili NEGATIVE / Urobili NORMAL       Blood NEGATIVE / Protein 100 / Leuk Est NEGATIVE / Nitrite NEGATIVE      RBC 0-2 / WBC 0-2 / Hyaline NEGATIVE / Gran  / Sq Epi OCC / Non Sq Epi  / Bacteria NEGATIVE    Urine Creatinine 98.20      [05-29-19 @ 00:30]  Urine Protein 132.6      [05-29-19 @ 00:30]  Urine Urea Nitrogen 733.1      [05-27-19 @ 17:48]    HbA1c 9.0      [05-24-19 @ 05:10]  TSH 2.29      [05-24-19 @ 05:10]  Lipid: chol 128, , HDL 37, LDL 79      [05-24-19 @ 05:10]    HCV 15.53, Reactive      [05-25-19 @ 03:12]      RADIOLOGY & ADDITIONAL STUDIES:
CHAUNCEY PRATER:[id number],   58yMale followed for:  No Known Allergies    PAST MEDICAL & SURGICAL HISTORY:  Diabetic foot ulcers  Chronic GERD  Diabetes mellitus, type 2  Hypertension  S/P cholecystectomy  PICC (peripherally inserted central catheter) in place    FAMILY HISTORY:  FH: type 2 diabetes  FH: hypertension    MEDICATIONS  (STANDING):  amLODIPine   Tablet 10 milliGRAM(s) Oral daily  aspirin enteric coated 81 milliGRAM(s) Oral daily  atorvastatin 40 milliGRAM(s) Oral at bedtime  ceFAZolin   IVPB 1000 milliGRAM(s) IV Intermittent every 8 hours  dextrose 5%. 1000 milliLiter(s) (50 mL/Hr) IV Continuous <Continuous>  dextrose 50% Injectable 12.5 Gram(s) IV Push once  dextrose 50% Injectable 25 Gram(s) IV Push once  dextrose 50% Injectable 25 Gram(s) IV Push once  famotidine    Tablet 20 milliGRAM(s) Oral daily  gabapentin 600 milliGRAM(s) Oral three times a day  heparin  Injectable 5000 Unit(s) SubCutaneous every 12 hours  insulin glargine Injectable (LANTUS) 50 Unit(s) SubCutaneous at bedtime  insulin lispro (HumaLOG) corrective regimen sliding scale   SubCutaneous three times a day before meals  insulin lispro (HumaLOG) corrective regimen sliding scale   SubCutaneous at bedtime  pantoprazole    Tablet 40 milliGRAM(s) Oral before breakfast  sodium chloride 0.9% lock flush 3 milliLiter(s) IV Push every 8 hours    MEDICATIONS  (PRN):  dextrose 40% Gel 15 Gram(s) Oral once PRN Blood Glucose LESS THAN 70 milliGRAM(s)/deciliter  glucagon  Injectable 1 milliGRAM(s) IntraMuscular once PRN Glucose LESS THAN 70 milligrams/deciliter      Vital Signs Last 24 Hrs  T(C): 36.8 (28 May 2019 05:45), Max: 37 (27 May 2019 14:26)  T(F): 98.2 (28 May 2019 05:45), Max: 98.6 (27 May 2019 14:26)  HR: 69 (28 May 2019 05:45) (69 - 75)  BP: 129/76 (28 May 2019 05:45) (113/78 - 129/76)  BP(mean): --  RR: 16 (28 May 2019 05:45) (16 - 18)  SpO2: 100% (28 May 2019 05:45) (99% - 100%)  nc/at  s1s2  cta  soft, nt, nd no guarding or rebound  no c/c/e    CBC Full  -  ( 27 May 2019 04:26 )  WBC Count : 6.77 K/uL  RBC Count : 4.27 M/uL  Hemoglobin : 13.6 g/dL  Hematocrit : 42.2 %  Platelet Count - Automated : 216 K/uL  Mean Cell Volume : 98.8 fL  Mean Cell Hemoglobin : 31.9 pg  Mean Cell Hemoglobin Concentration : 32.2 %  Auto Neutrophil # : x  Auto Lymphocyte # : x  Auto Monocyte # : x  Auto Eosinophil # : x  Auto Basophil # : x  Auto Neutrophil % : x  Auto Lymphocyte % : x  Auto Monocyte % : x  Auto Eosinophil % : x  Auto Basophil % : x    05-27    132<L>  |  97<L>  |  44<H>  ----------------------------<  194<H>  4.1   |  24  |  1.85<H>    Ca    8.7      27 May 2019 04:26  Mg     2.3     05-27
CHAUNCEY PRATER:[id number],   58yMale followed for:  No Known Allergies    PAST MEDICAL & SURGICAL HISTORY:  Diabetic foot ulcers  Chronic GERD  Diabetes mellitus, type 2  Hypertension  S/P cholecystectomy  PICC (peripherally inserted central catheter) in place    FAMILY HISTORY:  FH: type 2 diabetes  FH: hypertension    MEDICATIONS  (STANDING):  amLODIPine   Tablet 10 milliGRAM(s) Oral daily  aspirin enteric coated 81 milliGRAM(s) Oral daily  atorvastatin 40 milliGRAM(s) Oral at bedtime  ceFAZolin   IVPB 1000 milliGRAM(s) IV Intermittent every 8 hours  dextrose 5%. 1000 milliLiter(s) (50 mL/Hr) IV Continuous <Continuous>  dextrose 50% Injectable 12.5 Gram(s) IV Push once  dextrose 50% Injectable 25 Gram(s) IV Push once  dextrose 50% Injectable 25 Gram(s) IV Push once  famotidine    Tablet 20 milliGRAM(s) Oral daily  gabapentin 600 milliGRAM(s) Oral three times a day  heparin  Injectable 5000 Unit(s) SubCutaneous every 12 hours  insulin glargine Injectable (LANTUS) 50 Unit(s) SubCutaneous at bedtime  insulin lispro (HumaLOG) corrective regimen sliding scale   SubCutaneous three times a day before meals  insulin lispro (HumaLOG) corrective regimen sliding scale   SubCutaneous at bedtime  pantoprazole    Tablet 40 milliGRAM(s) Oral before breakfast  sodium chloride 0.9% lock flush 3 milliLiter(s) IV Push every 8 hours    MEDICATIONS  (PRN):  dextrose 40% Gel 15 Gram(s) Oral once PRN Blood Glucose LESS THAN 70 milliGRAM(s)/deciliter  glucagon  Injectable 1 milliGRAM(s) IntraMuscular once PRN Glucose LESS THAN 70 milligrams/deciliter      Vital Signs Last 24 Hrs  T(C): 36.6 (29 May 2019 05:44), Max: 36.9 (28 May 2019 13:13)  T(F): 97.9 (29 May 2019 05:44), Max: 98.4 (28 May 2019 13:13)  HR: 72 (29 May 2019 05:44) (70 - 78)  BP: 119/76 (29 May 2019 05:44) (119/76 - 126/81)  BP(mean): --  RR: 18 (29 May 2019 05:44) (17 - 18)  SpO2: 96% (29 May 2019 05:44) (96% - 100%)  nc/at  s1s2  cta  soft, nt, nd no guarding or rebound  no c/c/e    CBC Full  -  ( 29 May 2019 07:39 )  WBC Count : 5.14 K/uL  RBC Count : 4.28 M/uL  Hemoglobin : 13.6 g/dL  Hematocrit : 41.3 %  Platelet Count - Automated : 222 K/uL  Mean Cell Volume : 96.5 fL  Mean Cell Hemoglobin : 31.8 pg  Mean Cell Hemoglobin Concentration : 32.9 %  Auto Neutrophil # : x  Auto Lymphocyte # : x  Auto Monocyte # : x  Auto Eosinophil # : x  Auto Basophil # : x  Auto Neutrophil % : x  Auto Lymphocyte % : x  Auto Monocyte % : x  Auto Eosinophil % : x  Auto Basophil % : x    05-29    138  |  102  |  30<H>  ----------------------------<  117<H>  4.4   |  25  |  1.13    Ca    9.4      29 May 2019 07:39  Mg     2.1     05-28
CHAUNCEY PRATER:[id number],   58yMale followed for:  No Known Allergies    PAST MEDICAL & SURGICAL HISTORY:  Diabetic foot ulcers  Chronic GERD  Diabetes mellitus, type 2  Hypertension  S/P cholecystectomy  PICC (peripherally inserted central catheter) in place    FAMILY HISTORY:  FH: type 2 diabetes  FH: hypertension    MEDICATIONS  (STANDING):  amLODIPine   Tablet 10 milliGRAM(s) Oral daily  aspirin enteric coated 81 milliGRAM(s) Oral daily  atorvastatin 40 milliGRAM(s) Oral at bedtime  ceFAZolin   IVPB 1000 milliGRAM(s) IV Intermittent every 8 hours  chlorhexidine 4% Liquid 1 Application(s) Topical two times a day  dextrose 5%. 1000 milliLiter(s) (50 mL/Hr) IV Continuous <Continuous>  dextrose 50% Injectable 12.5 Gram(s) IV Push once  dextrose 50% Injectable 25 Gram(s) IV Push once  dextrose 50% Injectable 25 Gram(s) IV Push once  famotidine    Tablet 20 milliGRAM(s) Oral daily  gabapentin 600 milliGRAM(s) Oral three times a day  heparin  Injectable 5000 Unit(s) SubCutaneous every 12 hours  insulin glargine Injectable (LANTUS) 50 Unit(s) SubCutaneous at bedtime  insulin lispro (HumaLOG) corrective regimen sliding scale   SubCutaneous three times a day before meals  insulin lispro (HumaLOG) corrective regimen sliding scale   SubCutaneous at bedtime  pantoprazole    Tablet 40 milliGRAM(s) Oral before breakfast  sodium chloride 0.9% lock flush 3 milliLiter(s) IV Push every 8 hours    MEDICATIONS  (PRN):  dextrose 40% Gel 15 Gram(s) Oral once PRN Blood Glucose LESS THAN 70 milliGRAM(s)/deciliter  glucagon  Injectable 1 milliGRAM(s) IntraMuscular once PRN Glucose LESS THAN 70 milligrams/deciliter      Vital Signs Last 24 Hrs  T(C): 36.6 (31 May 2019 05:14), Max: 36.7 (30 May 2019 14:07)  T(F): 97.8 (31 May 2019 05:14), Max: 98.1 (30 May 2019 20:39)  HR: 72 (31 May 2019 05:14) (72 - 80)  BP: 137/88 (31 May 2019 05:14) (131/81 - 140/81)  BP(mean): --  RR: 18 (31 May 2019 05:14) (18 - 18)  SpO2: 97% (31 May 2019 05:14) (97% - 99%)  nc/at  s1s2  cta  soft, nt, nd no guarding or rebound  no c/c/e    CBC Full  -  ( 31 May 2019 04:00 )  WBC Count : 5.68 K/uL  RBC Count : 4.14 M/uL  Hemoglobin : 13.1 g/dL  Hematocrit : 41.0 %  Platelet Count - Automated : 231 K/uL  Mean Cell Volume : 99.0 fL  Mean Cell Hemoglobin : 31.6 pg  Mean Cell Hemoglobin Concentration : 32.0 %  Auto Neutrophil # : x  Auto Lymphocyte # : x  Auto Monocyte # : x  Auto Eosinophil # : x  Auto Basophil # : x  Auto Neutrophil % : x  Auto Lymphocyte % : x  Auto Monocyte % : x  Auto Eosinophil % : x  Auto Basophil % : x    05-30    138  |  102  |  24<H>  ----------------------------<  97  4.3   |  26  |  1.07    Ca    9.5      30 May 2019 06:00  Mg     2.0     05-30
Cardiovascular Disease Progress Note    Overnight events: No acute events overnight. Mr. Tristan denies chest pain or SOB.     Otherwise review of systems negative    Objective Findings:  T(C): 36.7 (05-30-19 @ 06:11), Max: 36.8 (05-29-19 @ 12:28)  HR: 78 (05-30-19 @ 06:11) (70 - 78)  BP: 132/78 (05-30-19 @ 06:11) (127/86 - 139/72)  RR: 18 (05-30-19 @ 06:11) (17 - 18)  SpO2: 99% (05-30-19 @ 06:11) (97% - 99%)  Wt(kg): --  Daily     Daily       Physical Exam:  Gen: NAD  HEENT: EOMI  CV: RRR, normal S1 + S2, no m/r/g  Lungs: CTAB  Abd: soft, non-tender  Ext: No edema    Telemetry: Sinus    Laboratory Data:                        13.4   5.86  )-----------( 232      ( 30 May 2019 06:00 )             41.7     05-30    138  |  102  |  24<H>  ----------------------------<  97  4.3   |  26  |  1.07    Ca    9.5      30 May 2019 06:00  Mg     2.0     05-30                Inpatient Medications:  MEDICATIONS  (STANDING):  amLODIPine   Tablet 10 milliGRAM(s) Oral daily  aspirin enteric coated 81 milliGRAM(s) Oral daily  atorvastatin 40 milliGRAM(s) Oral at bedtime  ceFAZolin   IVPB 1000 milliGRAM(s) IV Intermittent every 8 hours  chlorhexidine 4% Liquid 1 Application(s) Topical two times a day  dextrose 5%. 1000 milliLiter(s) (50 mL/Hr) IV Continuous <Continuous>  dextrose 50% Injectable 12.5 Gram(s) IV Push once  dextrose 50% Injectable 25 Gram(s) IV Push once  dextrose 50% Injectable 25 Gram(s) IV Push once  famotidine    Tablet 20 milliGRAM(s) Oral daily  gabapentin 600 milliGRAM(s) Oral three times a day  heparin  Injectable 5000 Unit(s) SubCutaneous every 12 hours  insulin glargine Injectable (LANTUS) 50 Unit(s) SubCutaneous at bedtime  insulin lispro (HumaLOG) corrective regimen sliding scale   SubCutaneous three times a day before meals  insulin lispro (HumaLOG) corrective regimen sliding scale   SubCutaneous at bedtime  pantoprazole    Tablet 40 milliGRAM(s) Oral before breakfast  sodium chloride 0.9% lock flush 3 milliLiter(s) IV Push every 8 hours      Assessment: 58 year old man with HTN, uncontrolled IDDM, 1st degree AV block and PVD presents with angina.    #Angina-  Nuclear stress test with no evidence of inducible ischemia.  Continue current management.     #Creatinine elevation-  Likely pre-renal and now improved.  Renal evaluation appreciated.     #Hepatitis C-  Newly diagnosed.  PCR RNA noted  GI f/u appreciated.    #Diabetic foot ulcers-  s/pt IR for PICC line replacement.  Ancef through June 6th 2019.  Wound care.    #HTN-   BP acceptable on current regimen.      Discharge planning if the rest of Hepatitis C work up can be done outpatient.     Over 25 minutes spent on total encounter; more than 50% of the visit was spent counseling and/or coordinating care by the attending physician.      Dallas Ball MD [facility]  Cardiovascular Disease  [phone]
Cardiovascular Disease Progress Note    Overnight events: No acute events overnight. Mr. Tristan denies chest pain or SOB.     Otherwise review of systems negative    Objective Findings:  T(C): 36.7 (19 @ 05:25), Max: 36.8 (19 @ 19:40)  HR: 88 (19 @ 05:25) (73 - 88)  BP: 112/79 (19 @ 05:25) (112/79 - 116/79)  RR: 18 (19 @ 05:25) (18 - 19)  SpO2: 100% (19 @ 05:25) (100% - 100%)  Wt(kg): --  Daily     Daily Weight in k.1 (25 May 2019 06:45)      Physical Exam:  Gen: NAD  HEENT: EOMI  CV: RRR, normal S1 + S2, no m/r/g  Lungs: CTAB  Abd: soft, non-tender  Ext: No edema    Telemetry: Sinus    Laboratory Data:                        13.9   4.45  )-----------( 214      ( 25 May 2019 03:12 )             42.2         137  |  99  |  27<H>  ----------------------------<  157<H>  4.0   |  24  |  1.22    Ca    9.5      25 May 2019 03:12  Phos  3.8       Mg     1.9         TPro  8.4<H>  /  Alb  4.0  /  TBili  0.4  /  DBili  x   /  AST  49<H>  /  ALT  23  /  AlkPhos  105  05-23    PT/INR - ( 23 May 2019 20:00 )   PT: 11.7 SEC;   INR: 1.03          PTT - ( 23 May 2019 20:00 )  PTT:30.2 SEC  CARDIAC MARKERS ( 24 May 2019 05:10 )  x     / x     / 167 u/L / 4.63 ng/mL / x              Inpatient Medications:  MEDICATIONS  (STANDING):  amLODIPine   Tablet 10 milliGRAM(s) Oral daily  aspirin enteric coated 81 milliGRAM(s) Oral daily  atorvastatin 40 milliGRAM(s) Oral at bedtime  ceFAZolin   IVPB 1000 milliGRAM(s) IV Intermittent every 8 hours  dextrose 5%. 1000 milliLiter(s) (50 mL/Hr) IV Continuous <Continuous>  dextrose 50% Injectable 12.5 Gram(s) IV Push once  dextrose 50% Injectable 25 Gram(s) IV Push once  dextrose 50% Injectable 25 Gram(s) IV Push once  famotidine    Tablet 20 milliGRAM(s) Oral daily  gabapentin 600 milliGRAM(s) Oral three times a day  heparin  Injectable 5000 Unit(s) SubCutaneous every 12 hours  hydrochlorothiazide 12.5 milliGRAM(s) Oral daily  insulin glargine Injectable (LANTUS) 50 Unit(s) SubCutaneous at bedtime  insulin lispro (HumaLOG) corrective regimen sliding scale   SubCutaneous three times a day before meals  insulin lispro (HumaLOG) corrective regimen sliding scale   SubCutaneous at bedtime  losartan 100 milliGRAM(s) Oral daily  pantoprazole    Tablet 40 milliGRAM(s) Oral before breakfast  sodium chloride 0.9% lock flush 3 milliLiter(s) IV Push every 8 hours      Assessment: 58 year old man with HTN, uncontrolled IDDM, 1st degree AV block and PVD presents with angina.    #Angina-  Chest pain with exertion.  Patient has ruled out for ACS and EKG is sinus with 1st degree AV block.  Given risk factors of uncontrolled IDDM and peripheral vascular disease, will plan for further evaluation with pharmacologic nuclear stress testing.    #Diabetic foot ulcers-  s/pt IR for PICC line replacement.  Ancef through 2019.  Wound care.    #HTN-   BP acceptable on current regimen.      Over 25 minutes spent on total encounter; more than 50% of the visit was spent counseling and/or coordinating care by the attending physician.      Dallas Ball MD [facility]  Cardiovascular Disease  [phone]
Cardiovascular Disease Progress Note    Overnight events: No acute events overnight. Mr. Tristan denies chest pain or SOB.   Otherwise review of systems negative    Objective Findings:  T(C): 36.6 (19 @ 05:14), Max: 36.7 (19 @ 14:07)  HR: 72 (19 @ 05:14) (72 - 80)  BP: 137/88 (19 @ 05:14) (131/81 - 140/81)  RR: 18 (19 @ 05:14) (18 - 18)  SpO2: 97% (19 @ 05:14) (97% - 99%)  Wt(kg): --  Daily     Daily Weight in k (31 May 2019 05:14)      Physical Exam:  Gen: NAD  HEENT: EOMI  CV: RRR, normal S1 + S2, no m/r/g  Lungs: CTAB  Abd: soft, non-tender  Ext: No edema    Telemetry: Sinus    Laboratory Data:                        13.1   5.68  )-----------( 231      ( 31 May 2019 04:00 )             41.0         136  |  102  |  22  ----------------------------<  62<L>  5.0   |  20<L>  |  0.95    Ca    8.8      31 May 2019 04:00  Mg     2.0                     Inpatient Medications:  MEDICATIONS  (STANDING):  amLODIPine   Tablet 10 milliGRAM(s) Oral daily  aspirin enteric coated 81 milliGRAM(s) Oral daily  atorvastatin 40 milliGRAM(s) Oral at bedtime  ceFAZolin   IVPB 1000 milliGRAM(s) IV Intermittent every 8 hours  chlorhexidine 4% Liquid 1 Application(s) Topical two times a day  dextrose 5%. 1000 milliLiter(s) (50 mL/Hr) IV Continuous <Continuous>  dextrose 50% Injectable 12.5 Gram(s) IV Push once  dextrose 50% Injectable 25 Gram(s) IV Push once  dextrose 50% Injectable 25 Gram(s) IV Push once  famotidine    Tablet 20 milliGRAM(s) Oral daily  gabapentin 600 milliGRAM(s) Oral three times a day  heparin  Injectable 5000 Unit(s) SubCutaneous every 12 hours  insulin glargine Injectable (LANTUS) 50 Unit(s) SubCutaneous at bedtime  insulin lispro (HumaLOG) corrective regimen sliding scale   SubCutaneous three times a day before meals  insulin lispro (HumaLOG) corrective regimen sliding scale   SubCutaneous at bedtime  pantoprazole    Tablet 40 milliGRAM(s) Oral before breakfast  sodium chloride 0.9% lock flush 3 milliLiter(s) IV Push every 8 hours      Assessment: 58 year old man with HTN, uncontrolled IDDM, 1st degree AV block and PVD presents with angina.    #Angina-  Nuclear stress test with no evidence of inducible ischemia.  Continue current management.     #Creatinine elevation-  Likely pre-renal and now improved.  Renal evaluation appreciated.     #Hepatitis C-  Newly diagnosed.  PCR RNA noted  GI f/u appreciated.    #Diabetic foot ulcers-  s/pt IR for PICC line replacement.  Ancef through 2019.  Wound care.    #HTN-   BP acceptable on current regimen.      Discharge planning.  As per GI, the rest of Hepatitis C work up can be done outpatient.       Plan of Care:    Over 25 minutes spent on total encounter; more than 50% of the visit was spent counseling and/or coordinating care by the attending physician.      Dallas Ball MD [facility]  Cardiovascular Disease  [phone]
Cardiovascular Disease Progress Note    Overnight events: No acute events overnight. Mr. Tristan denies chest pain or SOB.   Otherwise review of systems negative    Objective Findings:  T(C): 36.6 (19 @ 05:44), Max: 36.9 (19 @ 13:13)  HR: 72 (19 @ 05:44) (70 - 78)  BP: 119/76 (19 @ 05:44) (119/76 - 126/81)  RR: 18 (19 @ 05:44) (17 - 18)  SpO2: 96% (19 @ 05:44) (96% - 100%)  Wt(kg): --  Daily     Daily Weight in k (29 May 2019 05:44)      Physical Exam:  Gen: NAD  HEENT: EOMI  CV: RRR, normal S1 + S2, no m/r/g  Lungs: CTAB  Abd: soft, non-tender  Ext: No edema    Telemetry: Sinus    Laboratory Data:                        13.6   5.14  )-----------( 222      ( 29 May 2019 07:39 )             41.3         138  |  102  |  30<H>  ----------------------------<  117<H>  4.4   |  25  |  1.13    Ca    9.4      29 May 2019 07:39  Mg     2.1                     Inpatient Medications:  MEDICATIONS  (STANDING):  amLODIPine   Tablet 10 milliGRAM(s) Oral daily  aspirin enteric coated 81 milliGRAM(s) Oral daily  atorvastatin 40 milliGRAM(s) Oral at bedtime  ceFAZolin   IVPB 1000 milliGRAM(s) IV Intermittent every 8 hours  dextrose 5%. 1000 milliLiter(s) (50 mL/Hr) IV Continuous <Continuous>  dextrose 50% Injectable 12.5 Gram(s) IV Push once  dextrose 50% Injectable 25 Gram(s) IV Push once  dextrose 50% Injectable 25 Gram(s) IV Push once  famotidine    Tablet 20 milliGRAM(s) Oral daily  gabapentin 600 milliGRAM(s) Oral three times a day  heparin  Injectable 5000 Unit(s) SubCutaneous every 12 hours  insulin glargine Injectable (LANTUS) 50 Unit(s) SubCutaneous at bedtime  insulin lispro (HumaLOG) corrective regimen sliding scale   SubCutaneous three times a day before meals  insulin lispro (HumaLOG) corrective regimen sliding scale   SubCutaneous at bedtime  pantoprazole    Tablet 40 milliGRAM(s) Oral before breakfast  sodium chloride 0.9% lock flush 3 milliLiter(s) IV Push every 8 hours      Assessment: 58 year old man with HTN, uncontrolled IDDM, 1st degree AV block and PVD presents with angina.    #Angina-  Nuclear stress test with no evidence of inducible ischemia.  Continue current management.     #Creatinine elevation-  Likely pre-renal and now improved.  Renal evaluation appreciated.     #Hepatitis C-  Newly diagnosed.  PCR RNA noted  GI f/u.    #Diabetic foot ulcers-  s/pt IR for PICC line replacement.  Ancef through 2019.  Wound care.    #HTN-   BP acceptable on current regimen.        Over 25 minutes spent on total encounter; more than 50% of the visit was spent counseling and/or coordinating care by the attending physician.      Dallas Ball MD [facility]  Cardiovascular Disease  [phone]
Cardiovascular Disease Progress Note    Overnight events: No acute events overnight. Mr. Tristan denies chest pain or SOB. He is currently undergoing stress testing.     Otherwise review of systems negative    Objective Findings:  T(C): 36.6 (19 @ 05:36), Max: 37.1 (19 @ 13:44)  HR: 74 (19 @ 05:36) (74 - 88)  BP: 115/76 (19 @ 05:36) (93/63 - 115/76)  RR: 18 (19 @ 05:36) (18 - 18)  SpO2: 100% (19 @ 05:36) (96% - 100%)  Wt(kg): --  Daily     Daily Weight in k.4 (26 May 2019 06:47)      Physical Exam:  Gen: NAD  HEENT: EOMI  CV: RRR, normal S1 + S2, no m/r/g  Lungs: CTAB  Abd: soft, non-tender  Ext: No edema    Telemetry: Sinus    Laboratory Data:                        14.0   6.67  )-----------( 218      ( 26 May 2019 03:50 )             43.1         136  |  98  |  37<H>  ----------------------------<  133<H>  3.9   |  24  |  1.59<H>    Ca    9.4      26 May 2019 03:50  Mg     2.0                     Inpatient Medications:  MEDICATIONS  (STANDING):  amLODIPine   Tablet 10 milliGRAM(s) Oral daily  aspirin enteric coated 81 milliGRAM(s) Oral daily  atorvastatin 40 milliGRAM(s) Oral at bedtime  ceFAZolin   IVPB 1000 milliGRAM(s) IV Intermittent every 8 hours  dextrose 5%. 1000 milliLiter(s) (50 mL/Hr) IV Continuous <Continuous>  dextrose 50% Injectable 12.5 Gram(s) IV Push once  dextrose 50% Injectable 25 Gram(s) IV Push once  dextrose 50% Injectable 25 Gram(s) IV Push once  famotidine    Tablet 20 milliGRAM(s) Oral daily  gabapentin 600 milliGRAM(s) Oral three times a day  heparin  Injectable 5000 Unit(s) SubCutaneous every 12 hours  hydrochlorothiazide 12.5 milliGRAM(s) Oral daily  insulin glargine Injectable (LANTUS) 50 Unit(s) SubCutaneous at bedtime  insulin lispro (HumaLOG) corrective regimen sliding scale   SubCutaneous three times a day before meals  insulin lispro (HumaLOG) corrective regimen sliding scale   SubCutaneous at bedtime  pantoprazole    Tablet 40 milliGRAM(s) Oral before breakfast  sodium chloride 0.9% lock flush 3 milliLiter(s) IV Push every 8 hours      Assessment: 58 year old man with HTN, uncontrolled IDDM, 1st degree AV block and PVD presents with angina.    #Angina-  Chest pain with exertion.  Patient has ruled out for ACS and EKG is sinus with 1st degree AV block.  Given risk factors of uncontrolled IDDM and peripheral vascular disease, plan for further evaluation with pharmacologic nuclear stress testing today.    #Creatinine elevation-  Unclear etiology.  Losartan held.  Continue to monitor.     #Hepatitis C-  Newly diagnosed.  GI evaluation called.    #Diabetic foot ulcers-  s/pt IR for PICC line replacement.  Ancef through 2019.  Wound care.    #HTN-   BP acceptable on current regimen.      Over 25 minutes spent on total encounter; more than 50% of the visit was spent counseling and/or coordinating care by the attending physician.      Dallas Ball MD [facility]  Cardiovascular Disease  [phone]
Cardiovascular Disease Progress Note    Overnight events: No acute events overnight. No chest pain or SOB.   Otherwise review of systems negative    Objective Findings:  T(C): 36.4 (19 @ 05:21), Max: 36.4 (19 @ 05:21)  HR: 75 (19 @ 08:45) (63 - 76)  BP: 113/83 (19 @ 08:45) (100/73 - 113/83)  RR: 18 (19 @ 08:45) (18 - 18)  SpO2: 100% (19 @ 08:45) (99% - 100%)  Wt(kg): --  Daily     Daily Weight in k.2 (27 May 2019 07:16)      Physical Exam:  Gen: NAD  HEENT: EOMI  CV: RRR, normal S1 + S2, no m/r/g  Lungs: CTAB  Abd: soft, non-tender  Ext: No edema    Telemetry: Sinus    Laboratory Data:                        13.6   6.77  )-----------( 216      ( 27 May 2019 04:26 )             42.2         132<L>  |  97<L>  |  44<H>  ----------------------------<  194<H>  4.1   |  24  |  1.85<H>    Ca    8.7      27 May 2019 04:26  Mg     2.3                     Inpatient Medications:  MEDICATIONS  (STANDING):  amLODIPine   Tablet 10 milliGRAM(s) Oral daily  aspirin enteric coated 81 milliGRAM(s) Oral daily  atorvastatin 40 milliGRAM(s) Oral at bedtime  ceFAZolin   IVPB 1000 milliGRAM(s) IV Intermittent every 8 hours  dextrose 5%. 1000 milliLiter(s) (50 mL/Hr) IV Continuous <Continuous>  dextrose 50% Injectable 12.5 Gram(s) IV Push once  dextrose 50% Injectable 25 Gram(s) IV Push once  dextrose 50% Injectable 25 Gram(s) IV Push once  famotidine    Tablet 20 milliGRAM(s) Oral daily  gabapentin 600 milliGRAM(s) Oral three times a day  heparin  Injectable 5000 Unit(s) SubCutaneous every 12 hours  hydrochlorothiazide 12.5 milliGRAM(s) Oral daily  insulin glargine Injectable (LANTUS) 50 Unit(s) SubCutaneous at bedtime  insulin lispro (HumaLOG) corrective regimen sliding scale   SubCutaneous three times a day before meals  insulin lispro (HumaLOG) corrective regimen sliding scale   SubCutaneous at bedtime  pantoprazole    Tablet 40 milliGRAM(s) Oral before breakfast  sodium chloride 0.9% lock flush 3 milliLiter(s) IV Push every 8 hours      Assessment: 58 year old man with HTN, uncontrolled IDDM, 1st degree AV block and PVD presents with angina.    #Angina-  Nuclear stress test with no evidence of inducible ischemia.  Continue current management.     #Creatinine elevation-  Unclear etiology.  Losartan held.  Renal evaluation, Dr. Huffman, called.     #Hepatitis C-  Newly diagnosed.  GI evaluation appreciated.     #Diabetic foot ulcers-  s/pt IR for PICC line replacement.  Ancef through 2019.  Wound care.    #HTN-   BP acceptable on current regimen.      Over 25 minutes spent on total encounter; more than 50% of the visit was spent counseling and/or coordinating care by the attending physician.      Dallas Ball MD [facility]  Cardiovascular Disease  [phone]
[facility] NEPHROLOGY PRACTICE   MD TAB PHELPS MD ANGELA WONG, PA    TEL:  OFFICE: [phone]  DR RODRIGUEZ CELL: [phone]  DR. JAIME CELL: [phone]  AMADOR LARA CELL: [phone]        Patient is a 58y old  Male who presents with a chief complaint of Possible L UE PICC Line dislodge x 1 day (28 May 2019 09:14)      Patient seen and examined at bedside. No chest pain/sob. c/o left foot pain    VITALS:  T(F): 98.2 (05-28-19 @ 05:45), Max: 98.6 (05-27-19 @ 14:26)  HR: 69 (05-28-19 @ 05:45)  BP: 129/76 (05-28-19 @ 05:45)  RR: 16 (05-28-19 @ 05:45)  SpO2: 100% (05-28-19 @ 05:45)  Wt(kg): --    05-27 @ 07:01  -  05-28 @ 07:00  --------------------------------------------------------  IN: 220 mL / OUT: 0 mL / NET: 220 mL          PHYSICAL EXAM:  Constitutional: NAD  Neck: No JVD  Respiratory: CTAB, no wheezes, rales or rhonchi  Cardiovascular: S1, S2, RRR  Gastrointestinal: BS+, soft, NT/ND  Extremities: left foot slightly swollen, + tenderness    Hospital Medications:   MEDICATIONS  (STANDING):  amLODIPine   Tablet 10 milliGRAM(s) Oral daily  aspirin enteric coated 81 milliGRAM(s) Oral daily  atorvastatin 40 milliGRAM(s) Oral at bedtime  ceFAZolin   IVPB 1000 milliGRAM(s) IV Intermittent every 8 hours  dextrose 5%. 1000 milliLiter(s) (50 mL/Hr) IV Continuous <Continuous>  dextrose 50% Injectable 12.5 Gram(s) IV Push once  dextrose 50% Injectable 25 Gram(s) IV Push once  dextrose 50% Injectable 25 Gram(s) IV Push once  famotidine    Tablet 20 milliGRAM(s) Oral daily  gabapentin 600 milliGRAM(s) Oral three times a day  heparin  Injectable 5000 Unit(s) SubCutaneous every 12 hours  insulin glargine Injectable (LANTUS) 50 Unit(s) SubCutaneous at bedtime  insulin lispro (HumaLOG) corrective regimen sliding scale   SubCutaneous three times a day before meals  insulin lispro (HumaLOG) corrective regimen sliding scale   SubCutaneous at bedtime  pantoprazole    Tablet 40 milliGRAM(s) Oral before breakfast  sodium chloride 0.9% lock flush 3 milliLiter(s) IV Push every 8 hours      LABS:  05-28    137  |  102  |  38<H>  ----------------------------<  98  4.3   |  22  |  1.52<H>    Ca    8.5      28 May 2019 07:35  Mg     2.1     05-28      Creatinine Trend: 1.52 <--, 1.85 <--, 1.59 <--, 1.22 <--, 1.13 <--, 1.13 <--                                12.7   5.19  )-----------( 212      ( 28 May 2019 07:35 )             38.7     Urine Studies:  Urinalysis - [05-27-19 @ 17:48]      Color LIGHT YELLOW / Appearance CLEAR / SG 1.016 / pH 6.0      Gluc NEGATIVE / Ketone NEGATIVE  / Bili NEGATIVE / Urobili NORMAL       Blood NEGATIVE / Protein 100 / Leuk Est NEGATIVE / Nitrite NEGATIVE      RBC 0-2 / WBC 0-2 / Hyaline NEGATIVE / Gran  / Sq Epi OCC / Non Sq Epi  / Bacteria NEGATIVE    Urine Creatinine 107.40      [05-27-19 @ 17:48]  Urine Urea Nitrogen 733.1      [05-27-19 @ 17:48]    HbA1c 9.0      [05-24-19 @ 05:10]  TSH 2.29      [05-24-19 @ 05:10]  Lipid: chol 128, , HDL 37, LDL 79      [05-24-19 @ 05:10]    HCV 15.53, Reactive      [05-25-19 @ 03:12]      RADIOLOGY & ADDITIONAL STUDIES:
Patient is a 58y Male     Patient is a 58y old  Male who presents with a chief complaint of Possible L UE PICC Line dislodge x 1 day (26 May 2019 14:10)      HPI:  58M, obese ambulates with a cane, history of DM2, HTN, Neuropathy, old RLE Great toe ulcer x 3 years and L LE Plantar foot ulcer since 4/22/19, out pt treatment with abx through a L UE PICC line. The pt says visiting nurse service came on 4/23 and adjusted the L UE PICC Line and it began to come out. VN attempted to push the PICC line back in. But was unsuccessful. EMS called and the pt sent to the Ed. In the Ed, the expresses when he injects his antibiotics, he experiences L UE, L chest and throat burning, with SOB and dizziness, which subsides on it's own. Denies nausea, vomit, falls, LOC, chills, diaphoresis. In the Ed,EKG SR with 1st * AVB, QW V2,, HsT= 15->14, received Ancef 1g IV and currently has no complaints. (24 May 2019 02:40)      PAST MEDICAL & SURGICAL HISTORY:  Diabetic foot ulcers  Chronic GERD  Diabetes mellitus, type 2  Hypertension  S/P cholecystectomy  PICC (peripherally inserted central catheter) in place      MEDICATIONS  (STANDING):  amLODIPine   Tablet 10 milliGRAM(s) Oral daily  aspirin enteric coated 81 milliGRAM(s) Oral daily  atorvastatin 40 milliGRAM(s) Oral at bedtime  ceFAZolin   IVPB 1000 milliGRAM(s) IV Intermittent every 8 hours  dextrose 5%. 1000 milliLiter(s) (50 mL/Hr) IV Continuous <Continuous>  dextrose 50% Injectable 12.5 Gram(s) IV Push once  dextrose 50% Injectable 25 Gram(s) IV Push once  dextrose 50% Injectable 25 Gram(s) IV Push once  famotidine    Tablet 20 milliGRAM(s) Oral daily  gabapentin 600 milliGRAM(s) Oral three times a day  heparin  Injectable 5000 Unit(s) SubCutaneous every 12 hours  hydrochlorothiazide 12.5 milliGRAM(s) Oral daily  insulin glargine Injectable (LANTUS) 50 Unit(s) SubCutaneous at bedtime  insulin lispro (HumaLOG) corrective regimen sliding scale   SubCutaneous three times a day before meals  insulin lispro (HumaLOG) corrective regimen sliding scale   SubCutaneous at bedtime  pantoprazole    Tablet 40 milliGRAM(s) Oral before breakfast  sodium chloride 0.9% lock flush 3 milliLiter(s) IV Push every 8 hours      Allergies    No Known Allergies    Intolerances        SOCIAL HISTORY:  Denies ETOh,Smoking,     FAMILY HISTORY:  FH: type 2 diabetes  FH: hypertension      REVIEW OF SYSTEMS:    CONSTITUTIONAL: No weakness, fevers or chills  EYES/ENT: No visual changes;  No vertigo or throat pain   NECK: No pain or stiffness  RESPIRATORY: No cough, wheezing, hemoptysis; No shortness of breath  CARDIOVASCULAR: No chest pain or palpitations  GASTROINTESTINAL: No abdominal or epigastric pain. No nausea, vomiting, or hematemesis; No diarrhea or constipation. No melena or hematochezia.  GENITOURINARY: No dysuria, frequency or hematuria  NEUROLOGICAL: No numbness or weakness  SKIN: No itching, burning, rashes, or lesions   All other review of systems is negative unless indicated above.    VITAL:  T(C): , Max: 36.4 (05-27-19 @ 05:21)  T(F): , Max: 97.6 (05-27-19 @ 05:21)  HR: 75 (05-27-19 @ 08:45)  BP: 113/83 (05-27-19 @ 08:45)  BP(mean): --  RR: 18 (05-27-19 @ 08:45)  SpO2: 100% (05-27-19 @ 08:45)  Wt(kg): --    I and O's:    05-26 @ 07:01  -  05-27 @ 07:00  --------------------------------------------------------  IN: 540 mL / OUT: 0 mL / NET: 540 mL          PHYSICAL EXAM:    Constitutional: NAD  HEENT: PERRLA,   Neck: No JVD  Respiratory: CTA B/L  Cardiovascular: S1 and S2  Gastrointestinal: BS+, soft, NT/ND  Extremities: No peripheral edema  Neurological: A/O x 3, no focal deficits  Psychiatric: Normal mood, normal affect  : No John  Skin: No rashes  Access: Not applicable  Back: No CVA tenderness    LABS:                        13.6   6.77  )-----------( 216      ( 27 May 2019 04:26 )             42.2     05-27    132<L>  |  97<L>  |  44<H>  ----------------------------<  194<H>  4.1   |  24  |  1.85<H>    Ca    8.7      27 May 2019 04:26  Mg     2.3     05-27            RADIOLOGY & ADDITIONAL STUDIES:
[facility] [place]   MD TAB PHELPS MD ANGELA WONG, PA    TEL:  OFFICE: [phone]  DR RODRIGUEZ CELL: [phone]  DR. JAIME CELL: [phone]  AMADOR LARA CELL: [phone]        Patient is a 58y old  Male who presents with a chief complaint of Possible L UE PICC Line dislodge x 1 day (29 May 2019 10:55)      Patient seen and examined at bedside. No chest pain/sob    VITALS:  T(F): 98.3 (05-29-19 @ 12:28), Max: 98.3 (05-29-19 @ 12:28)  HR: 74 (05-29-19 @ 12:28)  BP: 127/86 (05-29-19 @ 12:28)  RR: 17 (05-29-19 @ 12:28)  SpO2: 97% (05-29-19 @ 12:28)  Wt(kg): --        PHYSICAL EXAM:  Constitutional: NAD  Neck: No JVD  Respiratory: CTAB, no wheezes, rales or rhonchi  Cardiovascular: S1, S2, RRR  Gastrointestinal: BS+, soft, NT/ND  Extremities: No peripheral edema    Hospital Medications:   MEDICATIONS  (STANDING):  amLODIPine   Tablet 10 milliGRAM(s) Oral daily  aspirin enteric coated 81 milliGRAM(s) Oral daily  atorvastatin 40 milliGRAM(s) Oral at bedtime  ceFAZolin   IVPB 1000 milliGRAM(s) IV Intermittent every 8 hours  chlorhexidine 4% Liquid 1 Application(s) Topical two times a day  dextrose 5%. 1000 milliLiter(s) (50 mL/Hr) IV Continuous <Continuous>  dextrose 50% Injectable 12.5 Gram(s) IV Push once  dextrose 50% Injectable 25 Gram(s) IV Push once  dextrose 50% Injectable 25 Gram(s) IV Push once  famotidine    Tablet 20 milliGRAM(s) Oral daily  gabapentin 600 milliGRAM(s) Oral three times a day  heparin  Injectable 5000 Unit(s) SubCutaneous every 12 hours  insulin glargine Injectable (LANTUS) 50 Unit(s) SubCutaneous at bedtime  insulin lispro (HumaLOG) corrective regimen sliding scale   SubCutaneous three times a day before meals  insulin lispro (HumaLOG) corrective regimen sliding scale   SubCutaneous at bedtime  pantoprazole    Tablet 40 milliGRAM(s) Oral before breakfast  sodium chloride 0.9% lock flush 3 milliLiter(s) IV Push every 8 hours      LABS:  05-29    138  |  102  |  30<H>  ----------------------------<  117<H>  4.4   |  25  |  1.13    Ca    9.4      29 May 2019 07:39  Mg     2.1     05-28      Creatinine Trend: 1.13 <--, 1.52 <--, 1.85 <--, 1.59 <--, 1.22 <--, 1.13 <--, 1.13 <--                                13.6   5.14  )-----------( 222      ( 29 May 2019 07:39 )             41.3     Urine Studies:  Urinalysis - [05-27-19 @ 17:48]      Color LIGHT YELLOW / Appearance CLEAR / SG 1.016 / pH 6.0      Gluc NEGATIVE / Ketone NEGATIVE  / Bili NEGATIVE / Urobili NORMAL       Blood NEGATIVE / Protein 100 / Leuk Est NEGATIVE / Nitrite NEGATIVE      RBC 0-2 / WBC 0-2 / Hyaline NEGATIVE / Gran  / Sq Epi OCC / Non Sq Epi  / Bacteria NEGATIVE    Urine Creatinine 98.20      [05-29-19 @ 00:30]  Urine Protein 132.6      [05-29-19 @ 00:30]  Urine Urea Nitrogen 733.1      [05-27-19 @ 17:48]    HbA1c 9.0      [05-24-19 @ 05:10]  TSH 2.29      [05-24-19 @ 05:10]  Lipid: chol 128, , HDL 37, LDL 79      [05-24-19 @ 05:10]    HCV 15.53, Reactive      [05-25-19 @ 03:12]      RADIOLOGY & ADDITIONAL STUDIES:
Vascular & Interventional Radiology Post-Procedure Note    Pre-Procedure Diagnosis: Dislodged PICC  Post-Procedure Diagnosis: Same as pre.  Indications for Procedure: Prolonged need for antibiotics    : Gisela KNOX  Attending: Harmony KNOX    Procedure Details/Findings: Successful exchange of a double lumen LUE PICC line for a single lumen PICC     Complications: None  Estimated Blood Loss: Minimal  Specimen: None  Contrast: None  Sedation: None  Patient Condition/Disposition: Floor.
vss  ncat  s1s2  cta  soft nt  postive hep c ab.  full consult in am

## 2019-05-31 NOTE — DISCHARGE NOTE PROVIDER - PROVIDER TOKENS
FREE:[LAST:[Dr Rae],PHONE:[phone]],FAX:[(   )    -],FOLLOWUP:[1 week]],FREE:[LAST:[nikkie KNOX],PHONE:[(   )    -],FAX:[(   )    -],FOLLOWUP:[1 week]],PROVIDER:[TOKEN:[[id number]],FOLLOWUP:[2 weeks]],PROVIDER:[TOKEN:[[id number]],FOLLOWUP:[1-3 days]]

## 2019-05-31 NOTE — DISCHARGE NOTE NURSING/CASE MANAGEMENT/SOCIAL WORK - NSDCPEWEB_GEN_ALL_CORE
[facility] for Tobacco Control website --- [email or web address] --- [email or web address]

## 2019-05-31 NOTE — PROGRESS NOTE ADULT - ASSESSMENT
58M, obese ambulates with a cane, history of DM2, HTN, Neuropathy, old RLE Great toe ulcer x 3 years and L LE Plantar foot ulcer since 4/22/19 admitted to rule out ACS developed renal failure    A/P:  VICKY:  likely pre-renal (sec to uncontrolled DM with HCTZ), while on ARB. FEurea<35%  s/p off ARB last dose 05/26  Discontinue HCTZ (Received dose of 05/27)  s/p NS 75cc/hr for 1L  Optimize control of diabetes  renal function improved  US normal   Monitor renal function closely  Avoid Nephrotoxic meds    Hyponatremia:  Sec to uncontrolled DM + HCTZ  D/C HCTZ  better  Monitor Na level at present    HTN:  Controlled  continue norvasc  monitor    Proteinuria  urine p/c ratio 1.3  likely sec to DM  also +hep c, pending work up  ARB on hold sec to VICKY  monitor  outpatient follow up

## 2019-05-31 NOTE — DIETITIAN INITIAL EVALUATION ADULT. - OTHER INFO
Pt seen for Length of stay. Pt 59 yo male appears alert, oriented. Per Pt his appetite usually good; No chew/swallow problem voiced; no nausea/vomiting/diarrhea reported @ present. Per Pt his body weight: ~280#; no recent weight loss or weight changes voiced. Of note Pt's HbA1c level 9.0% (5/24). At home Pt tries to avoid Regular sugar reported. Consistent Carbohydrate diet, Heart Healthy diet discussed with Pt including better food choices, foods to avoid; written materials on diet also provided. RDN answered questions/concerns related to diet, Pt verbalized understanding. RDN remains available, Pt made aware.

## 2019-05-31 NOTE — DISCHARGE NOTE PROVIDER - CARE PROVIDER_API CALL
Dr Rae,   Phone: [phone]  Fax: (   )    -  Follow Up Time: 1 week    nikkie KNOX,   Phone: (   )    -  Fax: (   )    -  Follow Up Time: 1 week    Dallas Ball)  Internal Medicine  [address] [place] [address]  Phone: [phone]  Fax: [phone]  Follow Up Time: 2 weeks    Doug Huffman)  Internal Medicine; Nephrology  [address], [address] [place] [address]  Phone: [phone]  Fax: [phone]  Follow Up Time: 1-3 days

## 2019-05-31 NOTE — DISCHARGE NOTE PROVIDER - NSDCCPCAREPLAN_GEN_ALL_CORE_FT
PRINCIPAL DISCHARGE DIAGNOSIS  Diagnosis: Wound cellulitis  Assessment and Plan of Treatment: continue Cefazolin till 6/6/2019 for diabeteic foot ulcer

## 2019-05-31 NOTE — DISCHARGE NOTE PROVIDER - HOSPITAL COURSE
58 year old man with HTN, uncontrolled IDDM, 1st degree AV block and PVD presents with angina.        #Angina-    Nuclear stress test with no evidence of inducible ischemia.    Continue current management. Creatinine elevation-    Likely pre-renal and now improved.    Renal evaluation appreciated.         #Hepatitis C-    Newly diagnosed.    PCR RNA noted    GI f/u appreciated.        #Diabetic foot ulcers-    s/pt IR for PICC line replacement.    Ancef through June 6th 2019.    Wound care.BP acceptable on current regimen.      Patient is hemodynamically stable and without complaints and patient is ready for discharge.  Case discussed and medications reviewed with PMD.  Agreed with above. 58 year old man with HTN, uncontrolled IDDM, 1st degree AV block and PVD presents with angina.        #Angina-    Nuclear stress test with no evidence of inducible ischemia.    Continue current management. Creatinine elevation-    Likely pre-renal and now improved.    Renal evaluation appreciated.         #Hepatitis C-    Newly diagnosed.    PCR RNA noted    GI f/u appreciated.    Plan for further treatment as an outpatient.         #Diabetic foot ulcers-    s/pt IR for PICC line replacement.    Ancef through June 6th 2019.    Wound care.BP acceptable on current regimen.      Patient is hemodynamically stable and without complaints and patient is ready for discharge.  Case discussed and medications reviewed with PMD.  Agreed with above.

## 2019-05-31 NOTE — PROGRESS NOTE ADULT - REASON FOR ADMISSION
Possible L UE PICC Line dislodge x 1 day

## 2019-05-31 NOTE — DISCHARGE NOTE NURSING/CASE MANAGEMENT/SOCIAL WORK - NSDCPEEMAIL_GEN_ALL_CORE
[facility] email [email or web address]

## 2019-05-31 NOTE — PROGRESS NOTE ADULT - PROVIDER SPECIALTY LIST ADULT
Cardiology
Gastroenterology
Intervent Radiology
Nephrology
Cardiology
Gastroenterology
Nephrology

## 2019-06-04 LAB — HCV GENTYP BLD NAA+PROBE: SIGNIFICANT CHANGE UP

## 2023-01-29 NOTE — PATIENT PROFILE ADULT - NSPROPASSIVESMOKEEXPOSURE_GEN_A_NUR
Checked on bed, with unlabored respirations. No safety risk noted  No new complaints  Sitter - 1:1; Continued safety precaution  Gurney in low position, side rail up for pt safety.   Will continue to monitor for any complications.       No

## 2023-04-18 NOTE — DIETITIAN INITIAL EVALUATION ADULT. - PROBLEM SELECTOR PROBLEM 6
----- Message from Esperanza Patel MA sent at 4/18/2023  2:16 PM CDT -----  Regarding: FW: Please enter Creatinine order    ----- Message -----  From: Julia Wright  Sent: 4/18/2023   2:03 PM CDT  To: [facility] Nurse Msg Pool  Subject: Please enter Creatinine order                    Patient has scheduled their CT ABD Pelv exam on 05/10/2023.  Patient will need a Creatinine prior to this appt.  Please place an order for outpatient labs. Thank you.          Essential hypertension

## 2024-07-29 NOTE — DIETITIAN INITIAL EVALUATION ADULT. - DIET TYPE
DASH/TLC (sodium and cholesterol restricted diet)/regular/low fat/consistent carbohydrate (no snacks) Never smoker